# Patient Record
Sex: FEMALE | Race: WHITE | NOT HISPANIC OR LATINO | Employment: OTHER | URBAN - METROPOLITAN AREA
[De-identification: names, ages, dates, MRNs, and addresses within clinical notes are randomized per-mention and may not be internally consistent; named-entity substitution may affect disease eponyms.]

---

## 2017-01-08 DIAGNOSIS — G70.00 MYASTHENIA GRAVIS WITHOUT (ACUTE) EXACERBATION (HCC): ICD-10-CM

## 2017-01-27 ENCOUNTER — ALLSCRIPTS OFFICE VISIT (OUTPATIENT)
Dept: OTHER | Facility: OTHER | Age: 77
End: 2017-01-27

## 2017-02-08 DIAGNOSIS — G70.00 MYASTHENIA GRAVIS WITHOUT (ACUTE) EXACERBATION (HCC): ICD-10-CM

## 2017-02-08 DIAGNOSIS — G36.0 NEUROMYELITIS OPTICA (HCC): ICD-10-CM

## 2017-03-02 ENCOUNTER — LAB REQUISITION (OUTPATIENT)
Dept: LAB | Facility: HOSPITAL | Age: 77
End: 2017-03-02
Payer: MEDICARE

## 2017-03-02 DIAGNOSIS — G36.0 NEUROMYELITIS OPTICA (HCC): ICD-10-CM

## 2017-03-02 LAB
ALBUMIN SERPL BCP-MCNC: 2.8 G/DL (ref 3.5–5)
ALP SERPL-CCNC: 147 U/L (ref 46–116)
ALT SERPL W P-5'-P-CCNC: 15 U/L (ref 12–78)
ANION GAP SERPL CALCULATED.3IONS-SCNC: 7 MMOL/L (ref 4–13)
AST SERPL W P-5'-P-CCNC: 18 U/L (ref 5–45)
BASOPHILS # BLD AUTO: 0 THOUSANDS/ΜL (ref 0–0.1)
BASOPHILS NFR BLD AUTO: 1 % (ref 0–1)
BILIRUB SERPL-MCNC: 0.3 MG/DL (ref 0.2–1)
BUN SERPL-MCNC: 18 MG/DL (ref 5–25)
CALCIUM SERPL-MCNC: 8.5 MG/DL (ref 8.3–10.1)
CHLORIDE SERPL-SCNC: 100 MMOL/L (ref 100–108)
CO2 SERPL-SCNC: 32 MMOL/L (ref 21–32)
CREAT SERPL-MCNC: 0.83 MG/DL (ref 0.6–1.3)
EOSINOPHIL # BLD AUTO: 0.3 THOUSAND/ΜL (ref 0–0.61)
EOSINOPHIL NFR BLD AUTO: 5 % (ref 0–6)
ERYTHROCYTE [DISTWIDTH] IN BLOOD BY AUTOMATED COUNT: 14.5 % (ref 11.6–15.1)
GFR SERPL CREATININE-BSD FRML MDRD: >60 ML/MIN/1.73SQ M
GLUCOSE SERPL-MCNC: 94 MG/DL (ref 65–140)
HCT VFR BLD AUTO: 36.1 % (ref 37–47)
HGB BLD-MCNC: 11.6 G/DL (ref 12–16)
LYMPHOCYTES # BLD AUTO: 1.3 THOUSANDS/ΜL (ref 0.6–4.47)
LYMPHOCYTES NFR BLD AUTO: 21 % (ref 14–44)
MCH RBC QN AUTO: 31 PG (ref 27–31)
MCHC RBC AUTO-ENTMCNC: 32 G/DL (ref 31.4–37.4)
MCV RBC AUTO: 97 FL (ref 82–98)
MONOCYTES # BLD AUTO: 0.8 THOUSAND/ΜL (ref 0.17–1.22)
MONOCYTES NFR BLD AUTO: 14 % (ref 4–12)
NEUTROPHILS # BLD AUTO: 3.5 THOUSANDS/ΜL (ref 1.85–7.62)
NEUTS SEG NFR BLD AUTO: 59 % (ref 43–75)
PLATELET # BLD AUTO: 261 THOUSANDS/UL (ref 130–400)
PMV BLD AUTO: 8.7 FL (ref 8.9–12.7)
POTASSIUM SERPL-SCNC: 3.7 MMOL/L (ref 3.5–5.3)
PROT SERPL-MCNC: 6.9 G/DL (ref 6.4–8.2)
RBC # BLD AUTO: 3.73 MILLION/UL (ref 4.2–5.4)
SODIUM SERPL-SCNC: 139 MMOL/L (ref 136–145)
WBC # BLD AUTO: 5.9 THOUSAND/UL (ref 4.8–10.8)

## 2017-03-02 PROCEDURE — 85025 COMPLETE CBC W/AUTO DIFF WBC: CPT | Performed by: PHYSICIAN ASSISTANT

## 2017-03-02 PROCEDURE — 80053 COMPREHEN METABOLIC PANEL: CPT | Performed by: PHYSICIAN ASSISTANT

## 2017-03-24 RX ORDER — WARFARIN SODIUM 4 MG/1
4 TABLET ORAL EVERY EVENING
COMMUNITY

## 2017-03-24 RX ORDER — AMITRIPTYLINE HYDROCHLORIDE 75 MG/1
75 TABLET, FILM COATED ORAL
COMMUNITY
End: 2018-06-20 | Stop reason: DRUGHIGH

## 2017-03-24 RX ORDER — VENLAFAXINE 50 MG/1
50 TABLET ORAL EVERY MORNING
COMMUNITY
End: 2019-01-01 | Stop reason: DRUGHIGH

## 2017-03-27 ENCOUNTER — ANESTHESIA EVENT (OUTPATIENT)
Dept: PERIOP | Facility: AMBULARY SURGERY CENTER | Age: 77
End: 2017-03-27
Payer: MEDICARE

## 2017-03-27 PROBLEM — H26.9 CATARACT, RIGHT EYE: Status: ACTIVE | Noted: 2017-03-27

## 2017-03-28 ENCOUNTER — ANESTHESIA (OUTPATIENT)
Dept: PERIOP | Facility: AMBULARY SURGERY CENTER | Age: 77
End: 2017-03-28
Payer: MEDICARE

## 2017-03-28 ENCOUNTER — HOSPITAL ENCOUNTER (OUTPATIENT)
Facility: AMBULARY SURGERY CENTER | Age: 77
Setting detail: OUTPATIENT SURGERY
Discharge: HOME | End: 2017-03-28
Attending: OPHTHALMOLOGY | Admitting: OPHTHALMOLOGY
Payer: MEDICARE

## 2017-03-28 VITALS
TEMPERATURE: 97.4 F | OXYGEN SATURATION: 97 % | RESPIRATION RATE: 20 BRPM | SYSTOLIC BLOOD PRESSURE: 139 MMHG | WEIGHT: 260 LBS | BODY MASS INDEX: 39.4 KG/M2 | HEART RATE: 60 BPM | HEIGHT: 68 IN | DIASTOLIC BLOOD PRESSURE: 66 MMHG

## 2017-03-28 PROCEDURE — V2632 POST CHMBR INTRAOCULAR LENS: HCPCS | Performed by: OPHTHALMOLOGY

## 2017-03-28 DEVICE — IOL SN60WF 20.5: Type: IMPLANTABLE DEVICE | Site: EYE | Status: FUNCTIONAL

## 2017-03-28 RX ORDER — CYCLOPENTOLATE HYDROCHLORIDE 10 MG/ML
1 SOLUTION/ DROPS OPHTHALMIC
Status: COMPLETED | OUTPATIENT
Start: 2017-03-28 | End: 2017-03-28

## 2017-03-28 RX ORDER — LIDOCAINE HYDROCHLORIDE 10 MG/ML
INJECTION, SOLUTION INFILTRATION; PERINEURAL AS NEEDED
Status: DISCONTINUED | OUTPATIENT
Start: 2017-03-28 | End: 2017-03-28 | Stop reason: SURG

## 2017-03-28 RX ORDER — PROPOFOL 10 MG/ML
INJECTION, EMULSION INTRAVENOUS AS NEEDED
Status: DISCONTINUED | OUTPATIENT
Start: 2017-03-28 | End: 2017-03-28 | Stop reason: SURG

## 2017-03-28 RX ORDER — TOBRAMYCIN 3 MG/ML
SOLUTION/ DROPS OPHTHALMIC AS NEEDED
Status: DISCONTINUED | OUTPATIENT
Start: 2017-03-28 | End: 2017-03-28 | Stop reason: HOSPADM

## 2017-03-28 RX ORDER — TETRACAINE HYDROCHLORIDE 5 MG/ML
SOLUTION OPHTHALMIC AS NEEDED
Status: DISCONTINUED | OUTPATIENT
Start: 2017-03-28 | End: 2017-03-28 | Stop reason: HOSPADM

## 2017-03-28 RX ORDER — LIDOCAINE HYDROCHLORIDE 20 MG/ML
INJECTION, SOLUTION EPIDURAL; INFILTRATION; INTRACAUDAL; PERINEURAL AS NEEDED
Status: DISCONTINUED | OUTPATIENT
Start: 2017-03-28 | End: 2017-03-28 | Stop reason: HOSPADM

## 2017-03-28 RX ORDER — TROPICAMIDE 10 MG/ML
1 SOLUTION/ DROPS OPHTHALMIC
Status: COMPLETED | OUTPATIENT
Start: 2017-03-28 | End: 2017-03-28

## 2017-03-28 RX ORDER — SODIUM CHLORIDE 9 MG/ML
125 INJECTION, SOLUTION INTRAVENOUS CONTINUOUS
Status: DISCONTINUED | OUTPATIENT
Start: 2017-03-28 | End: 2017-03-28 | Stop reason: HOSPADM

## 2017-03-28 RX ORDER — ACETAMINOPHEN 325 MG/1
1000 TABLET ORAL EVERY 6 HOURS PRN
Status: DISCONTINUED | OUTPATIENT
Start: 2017-03-28 | End: 2017-03-28 | Stop reason: HOSPADM

## 2017-03-28 RX ORDER — OFLOXACIN 3 MG/ML
1 SOLUTION/ DROPS OPHTHALMIC
Status: COMPLETED | OUTPATIENT
Start: 2017-03-28 | End: 2017-03-28

## 2017-03-28 RX ADMIN — CYCLOPENTOLATE HYDROCHLORIDE 1 DROP: 10 SOLUTION/ DROPS OPHTHALMIC at 12:05

## 2017-03-28 RX ADMIN — CYCLOPENTOLATE HYDROCHLORIDE 1 DROP: 10 SOLUTION/ DROPS OPHTHALMIC at 11:45

## 2017-03-28 RX ADMIN — OFLOXACIN 1 DROP: 3 SOLUTION/ DROPS OPHTHALMIC at 11:54

## 2017-03-28 RX ADMIN — TROPICAMIDE 1 DROP: 10 SOLUTION/ DROPS OPHTHALMIC at 12:05

## 2017-03-28 RX ADMIN — CYCLOPENTOLATE HYDROCHLORIDE 1 DROP: 10 SOLUTION/ DROPS OPHTHALMIC at 11:54

## 2017-03-28 RX ADMIN — OFLOXACIN 1 DROP: 3 SOLUTION/ DROPS OPHTHALMIC at 12:05

## 2017-03-28 RX ADMIN — SODIUM CHLORIDE 125 ML/HR: 0.9 INJECTION, SOLUTION INTRAVENOUS at 11:53

## 2017-03-28 RX ADMIN — OFLOXACIN 1 DROP: 3 SOLUTION/ DROPS OPHTHALMIC at 11:45

## 2017-03-28 RX ADMIN — TROPICAMIDE 1 DROP: 10 SOLUTION/ DROPS OPHTHALMIC at 11:54

## 2017-03-28 RX ADMIN — LIDOCAINE HYDROCHLORIDE 20 MG: 10 INJECTION, SOLUTION INFILTRATION; PERINEURAL at 12:37

## 2017-03-28 RX ADMIN — PROPOFOL 75 MG: 10 INJECTION, EMULSION INTRAVENOUS at 12:37

## 2017-03-28 RX ADMIN — TROPICAMIDE 1 DROP: 10 SOLUTION/ DROPS OPHTHALMIC at 11:45

## 2017-04-18 ENCOUNTER — ALLSCRIPTS OFFICE VISIT (OUTPATIENT)
Dept: OTHER | Facility: OTHER | Age: 77
End: 2017-04-18

## 2017-06-19 ENCOUNTER — LAB REQUISITION (OUTPATIENT)
Dept: LAB | Facility: HOSPITAL | Age: 77
End: 2017-06-19
Payer: MEDICARE

## 2017-06-19 DIAGNOSIS — G70.00 MYASTHENIA GRAVIS WITHOUT (ACUTE) EXACERBATION (HCC): ICD-10-CM

## 2017-06-19 LAB
BASOPHILS # BLD AUTO: 0.03 THOUSANDS/ΜL (ref 0–0.1)
BASOPHILS NFR BLD AUTO: 1 % (ref 0–1)
EOSINOPHIL # BLD AUTO: 0.29 THOUSAND/ΜL (ref 0–0.61)
EOSINOPHIL NFR BLD AUTO: 5 % (ref 0–6)
ERYTHROCYTE [DISTWIDTH] IN BLOOD BY AUTOMATED COUNT: 16.2 % (ref 11.6–15.1)
HCT VFR BLD AUTO: 39.4 % (ref 34.8–46.1)
HGB BLD-MCNC: 12.3 G/DL (ref 11.5–15.4)
LYMPHOCYTES # BLD AUTO: 1.06 THOUSANDS/ΜL (ref 0.6–4.47)
LYMPHOCYTES NFR BLD AUTO: 19 % (ref 14–44)
MCH RBC QN AUTO: 31.7 PG (ref 26.8–34.3)
MCHC RBC AUTO-ENTMCNC: 31.2 G/DL (ref 31.4–37.4)
MCV RBC AUTO: 102 FL (ref 82–98)
MONOCYTES # BLD AUTO: 0.82 THOUSAND/ΜL (ref 0.17–1.22)
MONOCYTES NFR BLD AUTO: 15 % (ref 4–12)
NEUTROPHILS # BLD AUTO: 3.39 THOUSANDS/ΜL (ref 1.85–7.62)
NEUTS SEG NFR BLD AUTO: 60 % (ref 43–75)
NRBC BLD AUTO-RTO: 0 /100 WBCS
PLATELET # BLD AUTO: 381 THOUSANDS/UL (ref 149–390)
PMV BLD AUTO: 11.1 FL (ref 8.9–12.7)
RBC # BLD AUTO: 3.88 MILLION/UL (ref 3.81–5.12)
WBC # BLD AUTO: 5.6 THOUSAND/UL (ref 4.31–10.16)

## 2017-06-19 PROCEDURE — 85025 COMPLETE CBC W/AUTO DIFF WBC: CPT | Performed by: PSYCHIATRY & NEUROLOGY

## 2017-06-28 RX ORDER — GABAPENTIN 100 MG/1
100 CAPSULE ORAL 2 TIMES DAILY
COMMUNITY
End: 2019-01-01 | Stop reason: DRUGHIGH

## 2017-06-29 ENCOUNTER — ANESTHESIA EVENT (OUTPATIENT)
Dept: PERIOP | Facility: HOSPITAL | Age: 77
End: 2017-06-29
Payer: MEDICARE

## 2017-06-30 ENCOUNTER — HOSPITAL ENCOUNTER (OUTPATIENT)
Facility: HOSPITAL | Age: 77
Setting detail: OUTPATIENT SURGERY
Discharge: HOME HEALTH | End: 2017-06-30
Attending: UROLOGY | Admitting: UROLOGY
Payer: MEDICARE

## 2017-06-30 ENCOUNTER — ANESTHESIA (OUTPATIENT)
Dept: PERIOP | Facility: HOSPITAL | Age: 77
End: 2017-06-30
Payer: MEDICARE

## 2017-06-30 VITALS
BODY MASS INDEX: 37.89 KG/M2 | RESPIRATION RATE: 18 BRPM | HEIGHT: 68 IN | SYSTOLIC BLOOD PRESSURE: 119 MMHG | HEART RATE: 68 BPM | TEMPERATURE: 96.4 F | DIASTOLIC BLOOD PRESSURE: 57 MMHG | OXYGEN SATURATION: 96 % | WEIGHT: 250 LBS

## 2017-06-30 DIAGNOSIS — N21.0 CALCULUS IN BLADDER: ICD-10-CM

## 2017-06-30 PROBLEM — N31.9: Chronic | Status: ACTIVE | Noted: 2017-06-30

## 2017-06-30 LAB
BACTERIA UR QL AUTO: ABNORMAL /HPF
BILIRUB UR QL STRIP: NEGATIVE
CLARITY UR: ABNORMAL
COLOR UR: YELLOW
GLUCOSE UR STRIP-MCNC: NEGATIVE MG/DL
HGB UR QL STRIP.AUTO: ABNORMAL
KETONES UR STRIP-MCNC: NEGATIVE MG/DL
LEUKOCYTE ESTERASE UR QL STRIP: ABNORMAL
NITRITE UR QL STRIP: NEGATIVE
NON-SQ EPI CELLS URNS QL MICRO: ABNORMAL /HPF
PH UR STRIP.AUTO: 7 [PH] (ref 5–9)
PROT UR STRIP-MCNC: ABNORMAL MG/DL
RBC #/AREA URNS AUTO: ABNORMAL /HPF
SP GR UR STRIP.AUTO: 1.01 (ref 1–1.03)
UROBILINOGEN UR QL STRIP.AUTO: 0.2 E.U./DL
WBC #/AREA URNS AUTO: ABNORMAL /HPF

## 2017-06-30 PROCEDURE — 82360 CALCULUS ASSAY QUANT: CPT | Performed by: UROLOGY

## 2017-06-30 PROCEDURE — 88300 SURGICAL PATH GROSS: CPT | Performed by: UROLOGY

## 2017-06-30 PROCEDURE — 81001 URINALYSIS AUTO W/SCOPE: CPT | Performed by: UROLOGY

## 2017-06-30 RX ORDER — MAGNESIUM HYDROXIDE 1200 MG/15ML
LIQUID ORAL AS NEEDED
Status: DISCONTINUED | OUTPATIENT
Start: 2017-06-30 | End: 2017-06-30 | Stop reason: HOSPADM

## 2017-06-30 RX ORDER — PROPOFOL 10 MG/ML
INJECTION, EMULSION INTRAVENOUS AS NEEDED
Status: DISCONTINUED | OUTPATIENT
Start: 2017-06-30 | End: 2017-06-30 | Stop reason: SURG

## 2017-06-30 RX ORDER — OXYCODONE HYDROCHLORIDE AND ACETAMINOPHEN 5; 325 MG/1; MG/1
1 TABLET ORAL EVERY 4 HOURS PRN
Status: DISCONTINUED | OUTPATIENT
Start: 2017-06-30 | End: 2017-06-30 | Stop reason: HOSPADM

## 2017-06-30 RX ORDER — CIPROFLOXACIN 2 MG/ML
400 INJECTION, SOLUTION INTRAVENOUS ONCE
Status: COMPLETED | OUTPATIENT
Start: 2017-06-30 | End: 2017-06-30

## 2017-06-30 RX ORDER — FENTANYL CITRATE 50 UG/ML
INJECTION, SOLUTION INTRAMUSCULAR; INTRAVENOUS AS NEEDED
Status: DISCONTINUED | OUTPATIENT
Start: 2017-06-30 | End: 2017-06-30 | Stop reason: SURG

## 2017-06-30 RX ORDER — SODIUM CHLORIDE, SODIUM LACTATE, POTASSIUM CHLORIDE, CALCIUM CHLORIDE 600; 310; 30; 20 MG/100ML; MG/100ML; MG/100ML; MG/100ML
50 INJECTION, SOLUTION INTRAVENOUS CONTINUOUS
Status: DISCONTINUED | OUTPATIENT
Start: 2017-06-30 | End: 2017-06-30 | Stop reason: HOSPADM

## 2017-06-30 RX ADMIN — PROPOFOL 50 MG: 10 INJECTION, EMULSION INTRAVENOUS at 08:35

## 2017-06-30 RX ADMIN — PROPOFOL 50 MG: 10 INJECTION, EMULSION INTRAVENOUS at 08:42

## 2017-06-30 RX ADMIN — OXYCODONE HYDROCHLORIDE AND ACETAMINOPHEN 1 TABLET: 5; 325 TABLET ORAL at 09:30

## 2017-06-30 RX ADMIN — CIPROFLOXACIN 400 MG: 2 INJECTION, SOLUTION INTRAVENOUS at 07:55

## 2017-06-30 RX ADMIN — FENTANYL CITRATE 50 MCG: 50 INJECTION, SOLUTION INTRAMUSCULAR; INTRAVENOUS at 08:35

## 2017-06-30 RX ADMIN — SODIUM CHLORIDE, SODIUM LACTATE, POTASSIUM CHLORIDE, AND CALCIUM CHLORIDE 50 ML/HR: .6; .31; .03; .02 INJECTION, SOLUTION INTRAVENOUS at 07:42

## 2017-06-30 RX ADMIN — CIPROFLOXACIN 300 MG: 2 INJECTION INTRAVENOUS at 08:34

## 2017-06-30 RX ADMIN — FENTANYL CITRATE 50 MCG: 50 INJECTION, SOLUTION INTRAMUSCULAR; INTRAVENOUS at 08:50

## 2017-07-03 DIAGNOSIS — D72.821 MONOCYTOSIS (SYMPTOMATIC): ICD-10-CM

## 2017-07-03 DIAGNOSIS — C85.90 NON-HODGKIN LYMPHOMA (HCC): ICD-10-CM

## 2017-07-14 LAB
CA PHOS MFR STONE: 75 %
CALCIUM OXALATE DIHYDRATE MFR STONE IR: 10 %
COLOR STONE: NORMAL
COM MFR STONE: 15 %
COMMENT-STONE3: NORMAL
COMPOSITION: NORMAL
LABORATORY COMMENT REPORT: NORMAL
NIDUS STONE QL: NORMAL
PHOTO: NORMAL
SIZE STONE: NORMAL MM
STONE ANALYSIS-IMP: NORMAL
WT STONE: 13 MG

## 2017-07-17 ENCOUNTER — APPOINTMENT (OUTPATIENT)
Dept: LAB | Facility: HOSPITAL | Age: 77
End: 2017-07-17
Attending: INTERNAL MEDICINE
Payer: MEDICARE

## 2017-07-17 DIAGNOSIS — D72.821 MONOCYTOSIS (SYMPTOMATIC): ICD-10-CM

## 2017-07-17 LAB
ALBUMIN SERPL BCP-MCNC: 2.9 G/DL (ref 3.5–5)
ALP SERPL-CCNC: 132 U/L (ref 46–116)
ALT SERPL W P-5'-P-CCNC: 10 U/L (ref 12–78)
ANION GAP SERPL CALCULATED.3IONS-SCNC: 8 MMOL/L (ref 4–13)
AST SERPL W P-5'-P-CCNC: 17 U/L (ref 5–45)
BASOPHILS # BLD AUTO: 0.03 THOUSANDS/ΜL (ref 0–0.1)
BASOPHILS NFR BLD AUTO: 1 % (ref 0–1)
BILIRUB SERPL-MCNC: 0.28 MG/DL (ref 0.2–1)
BUN SERPL-MCNC: 11 MG/DL (ref 5–25)
CALCIUM SERPL-MCNC: 8.8 MG/DL (ref 8.3–10.1)
CHLORIDE SERPL-SCNC: 99 MMOL/L (ref 100–108)
CO2 SERPL-SCNC: 29 MMOL/L (ref 21–32)
CREAT SERPL-MCNC: 0.73 MG/DL (ref 0.6–1.3)
EOSINOPHIL # BLD AUTO: 0.24 THOUSAND/ΜL (ref 0–0.61)
EOSINOPHIL NFR BLD AUTO: 4 % (ref 0–6)
ERYTHROCYTE [DISTWIDTH] IN BLOOD BY AUTOMATED COUNT: 15.3 % (ref 11.6–15.1)
GFR SERPL CREATININE-BSD FRML MDRD: >60 ML/MIN/1.73SQ M
GLUCOSE P FAST SERPL-MCNC: 72 MG/DL (ref 65–99)
HCT VFR BLD AUTO: 37.4 % (ref 34.8–46.1)
HGB BLD-MCNC: 11.9 G/DL (ref 11.5–15.4)
LYMPHOCYTES # BLD AUTO: 1 THOUSANDS/ΜL (ref 0.6–4.47)
LYMPHOCYTES NFR BLD AUTO: 18 % (ref 14–44)
MCH RBC QN AUTO: 32.7 PG (ref 26.8–34.3)
MCHC RBC AUTO-ENTMCNC: 31.8 G/DL (ref 31.4–37.4)
MCV RBC AUTO: 103 FL (ref 82–98)
MONOCYTES # BLD AUTO: 0.89 THOUSAND/ΜL (ref 0.17–1.22)
MONOCYTES NFR BLD AUTO: 16 % (ref 4–12)
NEUTROPHILS # BLD AUTO: 3.41 THOUSANDS/ΜL (ref 1.85–7.62)
NEUTS SEG NFR BLD AUTO: 61 % (ref 43–75)
NRBC BLD AUTO-RTO: 0 /100 WBCS
PLATELET # BLD AUTO: 276 THOUSANDS/UL (ref 149–390)
PMV BLD AUTO: 10.9 FL (ref 8.9–12.7)
POTASSIUM SERPL-SCNC: 3.4 MMOL/L (ref 3.5–5.3)
PROT SERPL-MCNC: 6.7 G/DL (ref 6.4–8.2)
RBC # BLD AUTO: 3.64 MILLION/UL (ref 3.81–5.12)
SODIUM SERPL-SCNC: 136 MMOL/L (ref 136–145)
WBC # BLD AUTO: 5.58 THOUSAND/UL (ref 4.31–10.16)

## 2017-07-17 PROCEDURE — 36415 COLL VENOUS BLD VENIPUNCTURE: CPT

## 2017-07-17 PROCEDURE — 80053 COMPREHEN METABOLIC PANEL: CPT

## 2017-07-17 PROCEDURE — 85025 COMPLETE CBC W/AUTO DIFF WBC: CPT

## 2017-07-18 ENCOUNTER — ALLSCRIPTS OFFICE VISIT (OUTPATIENT)
Dept: OTHER | Facility: OTHER | Age: 77
End: 2017-07-18

## 2017-07-18 ENCOUNTER — HOSPITAL ENCOUNTER (OUTPATIENT)
Dept: INFUSION CENTER | Facility: HOSPITAL | Age: 77
Discharge: HOME | End: 2017-07-18
Payer: MEDICARE

## 2017-07-18 PROCEDURE — 96523 IRRIG DRUG DELIVERY DEVICE: CPT

## 2017-07-18 RX ADMIN — Medication 300 UNITS: at 14:36

## 2017-07-18 NOTE — PLAN OF CARE
Problem: Potential for Falls  Goal: Patient will remain free of falls  INTERVENTIONS:  - Assess patient frequently for physical needs  -  Identify cognitive and physical deficits and behaviors that affect risk of falls    -  [place] fall precautions as indicated by assessment   - Educate patient/family on patient safety including physical limitations  - Instruct patient to call for assistance with activity based on assessment  - Modify environment to reduce risk of injury  - Consider OT/PT consult to assist with strengthening/mobility   Outcome: Progressing

## 2017-08-10 ENCOUNTER — ALLSCRIPTS OFFICE VISIT (OUTPATIENT)
Dept: OTHER | Facility: OTHER | Age: 77
End: 2017-08-10

## 2017-08-10 ENCOUNTER — GENERIC CONVERSION - ENCOUNTER (OUTPATIENT)
Dept: OTHER | Facility: OTHER | Age: 77
End: 2017-08-10

## 2017-08-10 DIAGNOSIS — R53.2 FUNCTIONAL QUADRIPLEGIA (HCC): ICD-10-CM

## 2017-08-10 DIAGNOSIS — C85.90 NON-HODGKIN LYMPHOMA (HCC): ICD-10-CM

## 2017-08-10 DIAGNOSIS — M62.81 MUSCLE WEAKNESS (GENERALIZED): ICD-10-CM

## 2017-08-16 ENCOUNTER — HOSPITAL ENCOUNTER (OUTPATIENT)
Dept: RADIOLOGY | Facility: HOSPITAL | Age: 77
Discharge: HOME | End: 2017-08-16
Payer: MEDICARE

## 2017-08-16 ENCOUNTER — TRANSCRIBE ORDERS (OUTPATIENT)
Dept: ADMINISTRATIVE | Facility: HOSPITAL | Age: 77
End: 2017-08-16

## 2017-08-16 ENCOUNTER — HOSPITAL ENCOUNTER (OUTPATIENT)
Dept: RADIOLOGY | Facility: HOSPITAL | Age: 77
Discharge: HOME | End: 2017-08-16
Attending: INTERNAL MEDICINE
Payer: MEDICARE

## 2017-08-16 DIAGNOSIS — M62.81 MUSCLE WEAKNESS (GENERALIZED): Primary | ICD-10-CM

## 2017-08-16 DIAGNOSIS — M62.81 MUSCLE WEAKNESS (GENERALIZED): ICD-10-CM

## 2017-08-16 DIAGNOSIS — C85.90 NON-HODGKIN LYMPHOMA (HCC): ICD-10-CM

## 2017-08-16 PROCEDURE — 73560 X-RAY EXAM OF KNEE 1 OR 2: CPT

## 2017-08-16 PROCEDURE — 71260 CT THORAX DX C+: CPT

## 2017-08-16 RX ADMIN — IOHEXOL 85 ML: 350 INJECTION, SOLUTION INTRAVENOUS at 08:46

## 2017-08-17 ENCOUNTER — GENERIC CONVERSION - ENCOUNTER (OUTPATIENT)
Dept: OTHER | Facility: OTHER | Age: 77
End: 2017-08-17

## 2017-08-21 ENCOUNTER — GENERIC CONVERSION - ENCOUNTER (OUTPATIENT)
Dept: OTHER | Facility: OTHER | Age: 77
End: 2017-08-21

## 2017-09-08 ENCOUNTER — HOSPITAL ENCOUNTER (OUTPATIENT)
Dept: RADIOLOGY | Facility: HOSPITAL | Age: 77
Discharge: HOME | End: 2017-09-08
Payer: MEDICARE

## 2017-09-08 DIAGNOSIS — M62.81 MUSCLE WEAKNESS (GENERALIZED): ICD-10-CM

## 2017-09-08 DIAGNOSIS — R53.2 FUNCTIONAL QUADRIPLEGIA (HCC): ICD-10-CM

## 2017-09-08 PROCEDURE — 77080 DXA BONE DENSITY AXIAL: CPT

## 2017-09-11 ENCOUNTER — GENERIC CONVERSION - ENCOUNTER (OUTPATIENT)
Dept: OTHER | Facility: OTHER | Age: 77
End: 2017-09-11

## 2017-11-01 ENCOUNTER — LAB REQUISITION (OUTPATIENT)
Dept: LAB | Facility: HOSPITAL | Age: 77
End: 2017-11-01
Payer: MEDICARE

## 2017-11-01 DIAGNOSIS — G70.00 MYASTHENIA GRAVIS WITHOUT (ACUTE) EXACERBATION (HCC): ICD-10-CM

## 2017-11-01 LAB
BASOPHILS # BLD AUTO: 0.02 THOUSANDS/ΜL (ref 0–0.1)
BASOPHILS NFR BLD AUTO: 0 % (ref 0–1)
EOSINOPHIL # BLD AUTO: 0.32 THOUSAND/ΜL (ref 0–0.61)
EOSINOPHIL NFR BLD AUTO: 5 % (ref 0–6)
ERYTHROCYTE [DISTWIDTH] IN BLOOD BY AUTOMATED COUNT: 14.2 % (ref 11.6–15.1)
HCT VFR BLD AUTO: 38.2 % (ref 34.8–46.1)
HGB BLD-MCNC: 12.2 G/DL (ref 11.5–15.4)
LYMPHOCYTES # BLD AUTO: 1.24 THOUSANDS/ΜL (ref 0.6–4.47)
LYMPHOCYTES NFR BLD AUTO: 18 % (ref 14–44)
MCH RBC QN AUTO: 33.1 PG (ref 26.8–34.3)
MCHC RBC AUTO-ENTMCNC: 31.9 G/DL (ref 31.4–37.4)
MCV RBC AUTO: 104 FL (ref 82–98)
MONOCYTES # BLD AUTO: 0.8 THOUSAND/ΜL (ref 0.17–1.22)
MONOCYTES NFR BLD AUTO: 11 % (ref 4–12)
NEUTROPHILS # BLD AUTO: 4.6 THOUSANDS/ΜL (ref 1.85–7.62)
NEUTS SEG NFR BLD AUTO: 66 % (ref 43–75)
NRBC BLD AUTO-RTO: 0 /100 WBCS
PLATELET # BLD AUTO: 456 THOUSANDS/UL (ref 149–390)
PMV BLD AUTO: 10.7 FL (ref 8.9–12.7)
RBC # BLD AUTO: 3.69 MILLION/UL (ref 3.81–5.12)
WBC # BLD AUTO: 7 THOUSAND/UL (ref 4.31–10.16)

## 2017-11-01 PROCEDURE — 85025 COMPLETE CBC W/AUTO DIFF WBC: CPT | Performed by: PSYCHIATRY & NEUROLOGY

## 2017-11-13 DIAGNOSIS — G36.0 NEUROMYELITIS OPTICA (HCC): ICD-10-CM

## 2017-12-18 ENCOUNTER — APPOINTMENT (OUTPATIENT)
Dept: LAB | Facility: HOSPITAL | Age: 77
End: 2017-12-18
Payer: MEDICARE

## 2017-12-18 DIAGNOSIS — G36.0 NEUROMYELITIS OPTICA (HCC): ICD-10-CM

## 2017-12-18 LAB
ALBUMIN SERPL BCP-MCNC: 2.5 G/DL (ref 3.5–5)
ALP SERPL-CCNC: 106 U/L (ref 46–116)
ALT SERPL W P-5'-P-CCNC: 12 U/L (ref 12–78)
ANION GAP SERPL CALCULATED.3IONS-SCNC: 4 MMOL/L (ref 4–13)
AST SERPL W P-5'-P-CCNC: 16 U/L (ref 5–45)
BILIRUB SERPL-MCNC: 0.29 MG/DL (ref 0.2–1)
BUN SERPL-MCNC: 19 MG/DL (ref 5–25)
CALCIUM SERPL-MCNC: 9.1 MG/DL (ref 8.3–10.1)
CHLORIDE SERPL-SCNC: 102 MMOL/L (ref 100–108)
CO2 SERPL-SCNC: 29 MMOL/L (ref 21–32)
CREAT SERPL-MCNC: 0.92 MG/DL (ref 0.6–1.3)
GFR SERPL CREATININE-BSD FRML MDRD: 60 ML/MIN/1.73SQ M
GLUCOSE SERPL-MCNC: 86 MG/DL (ref 65–140)
POTASSIUM SERPL-SCNC: 4.2 MMOL/L (ref 3.5–5.3)
PROT SERPL-MCNC: 7.1 G/DL (ref 6.4–8.2)
SODIUM SERPL-SCNC: 135 MMOL/L (ref 136–145)

## 2017-12-18 PROCEDURE — 80053 COMPREHEN METABOLIC PANEL: CPT

## 2017-12-18 PROCEDURE — 36415 COLL VENOUS BLD VENIPUNCTURE: CPT

## 2017-12-19 ENCOUNTER — ALLSCRIPTS OFFICE VISIT (OUTPATIENT)
Dept: OTHER | Facility: OTHER | Age: 77
End: 2017-12-19

## 2017-12-20 DIAGNOSIS — G36.0 NEUROMYELITIS OPTICA (HCC): ICD-10-CM

## 2017-12-21 NOTE — PROGRESS NOTES
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)   2  Vasovagal syncope (780 2) (R55)    Plan   Neuromyelitis optica    · Mycophenolate Mofetil 500 MG Oral Tablet; TAKE 1 TABLET BY MOUTH ONCE A    DAY   Rx By: Nabeel Li; Dispense: 30 Days ; #:30 Tablet; Refill: 1;For: Neuromyelitis optica; HUDSON = N; Verified Transmission to [facility] #033; Last Updated By: System, SureScripts; 12/19/2017 2:32:50 PM   · Vitamin D (Ergocalciferol) 47034 UNIT Oral Capsule; TAKE ONE CAPSULE BY    MOUTH WEEKLY   Rx By: Nabeel Li; Dispense: 57 Days ; #:8 Capsule; Refill: 0;For: Neuromyelitis optica; HUDSON = N; Sent To: [facility]   · (1) CBC/PLT/DIFF; Status:Active; Requested for:[id number];    Perform:[facility]; Due:89Xnm4180; Ordered;For:Neuromyelitis optica; Ordered By:Dahiana Zaavla;  Other depressive disorder    · Venlafaxine HCl  MG Oral Capsule Extended Release 24 Hour; take 1    capsule daily   Rx By: Nabeel Li; Dispense: 30 Days ; #:30 Capsule Extended Release 24 Hour; Refill: 5;For: Other depressive disorder; HUDSON = N; Verified Transmission to [facility] #[facility]; Last Updated By: System, SureScripts; 12/19/2017 2:32:50 PM    Discussion/Summary   Discussion Summary:    Patient is here for follow up remains on Cellcept 500mg daily had labs drawn yesterday and I only have the CMP result  Does not appear CBC was drawn  The visiting nurse draws her labs  Last abs lymphs were done in Nov and were 1 24 [facility] to draw a CBC with diff this month recently had an increase in neuropathic pain and spasms in the legs and left arm  She is currently taking gabapentin 300mg TID  Rx is given by Dr Katlin Mejias  She has 600mg caps  Suggested she take 300mg BID and 600mg at bedtime for now did not see cardiology regarding her syncope, which was felt to be vasovagal  She reports she had an episode of syncope the other night   She was laying back in her chair against the sink and then she sat up very quickly and passed out  She has an established cardiologist Dr Holly Strickland, closer to her home, and will make an appt is stable today up in 4 months for any new symptoms  Counseling Documentation With Imm: The patient, patient's family was counseled regarding diagnostic results,-- instructions for management,-- risk factor reductions,-- prognosis,-- patient and family education,-- impressions,-- risks and benefits of treatment options,-- importance of compliance with treatment  total time of encounter was 30 minutes-- and-- >50% minutes was spent counseling  Chief Complaint   Chief Complaint Free Text Note Form: Patient presents for f/u for NMO      History of Present Illness   HPI: 69 y/o female presents for NMO follow up, last seen in August 2017  Review of past years includes admission in June 2014 for a syncopal episode  Pt was taken to [address] [place] [address] for eval  In the ED she was found to have BP of 105/69, sodium 131 and positive urine studies for UTI  She was found to be dehydrated and hyponatremic  She was admitted from 6/21/14 to 6/28/14  During her hospitalization, Dr Galicia of nephrology was following her case  He believed her sodium was low due to her Trileptal which was dosed at 600mg BID  She has since been titrated off Trileptal  Pt was also admitted from 8/7/14-8/13/14 for acute Devics symptoms  She woke up the day prior to her admission with the inability to move her legs as well as a burning sensation in legs, bowel and bladder incontinence    During her hospital stay she received MRI L-Spine & c-spine; which showed new T-spine lesion however no MRI t-spine or brain was ordered  She was treated with 5 days of IV steroids which has regained some movement in her legs  Also she was treated for left foot ulcer with concern for osteomyelitis   Pt has been hospitalized 2 additional times from 10/14/14-10/24/14 for left upper extremity and bilateral lower extremity weakness  She had MRI c-spine which reveals new demyelinating lesion as well as disease progression on MRI t-spine  She received 5 days of IV steroids followed up by 5 days of IVIG  She was then admitted for a second time from 11/26/14-11/28/14 for increased weakness in right leg and left upper extremity  She did not received any updated imaging and only received 1 dose of IV steroids in the ER  She was discharged to [facility] for continued rehab  From 1/2014 to 1/2015 pt has had 5 inpatient admissions for NMO flares: April 2014, June 2014, August 2014, October 2014, November 2014 and Jan 2015  Pt was hospitalized at [place] [address]  from 1/14/15-1/21/15 with SOB, Left LE weakness, wt gain  She was dx with NMO exacerbation, URI, Escherichia coli UTI, and SIADH  MRI of the thoracic spine revealed significant progression of disease at the upper thoracic cord, T1-T4, where diffuse cord enhancement was noted, new from prior study on 12/2014  MRI c-spine & l-spine were stable  She was given 5 doses of IVIG as well as 5 days of intravenous steroids  She was discharged back to [facility]  There have been discussions with Dr Hernandez at [facility] about possible Eculizumab trial, however she is not a candidate due to the fact that she is nonambulatory  She was on Cellcept 1,000mg BID, as well as Prednisone 20mg daily  Pt is getting frequent CBC w/diff as well as CMP  Gina Cunning At that time, starting WBC 5 36 & abs lymphs 0 8  She currently has suprapubic catheter, no urinary changes  April 2015, she described difficulty with increased muscle spasms from bilateral hips to her feet, as well as left hand numbness and swelling  Her diazepam was increased  Her prednisone was stopped  Unfortunately in August 2015, she developed further increased spasms in her lower extremity as well as left arm up  She noted increased weakness   She presented to the hospital  She had MRI Cervical Sequela of demyelinating disease at C5-6, C7, and upper thoracic spinal cord are less well-seen on today's study particularly at C7 and upper thoracic spinal cord  The C5-6 lesion has a grossly stable appearance from the prior study  Stable mild to moderate degenerative changes  MRI Brain 8/26/15: There are foci of T2 and FLAIR signal abnormality in the periventricular and subcortical white matter which are typical for microvascular disease in patients of this age group  Brainstem and cerebellum demonstrate normal signal  no abnormal enhancement  MRI Thoracic noted multifocal disease within the cord is stable save for the T10 level where a broad-based dorsal focus of signal abnormality has become more confluent  There is no pathologic enhancement to suggest acute inflammation  Minor degenerative changes without compressive cord or root disease  She was found to have a UTI  At that time, it was thought she was experiencing a Devicâs exacerbation and underwent 3 treatments of IVIG  She was transferred to [facility]  During her time at [facility], she was treated for lower extremity swelling, she had Doppler's done which were negative, her pain medication was also changed from gabapentin to Lyrica  From there, she was transferred to [facility]  Of note, in October, her absolute lymphocytes were noted to be decreased at 0 4  Dr Aziza Leija was contacted, he was made aware, her CellCept was subsequently decreased to 500 mg twice a day  She has been home around November 2015  She missed a several follow-up office visits in the interim  Patient in hospital in march 2016 due to breakdown of skin in both legs  pt was admitted and needed wound vac  pt was told the decbutius was down to bone but no infection in the bone  pt notes significant healing since finally getting to wound team     patient reports she is overall doing well  She remains on CellCept 500 mg daily   Patient reports she is in contact with Dr Robin Nixon as well  She reports a few months ago he increased her gabapentin to 300 mg t i d  He is the one who prescribed this for her  Patient reports she recently had an increase in neuropathic pain and spasms in her legs and the left arm  She continues to follow with Urology for her suprapubic catheter reports no issues with that  She denies any recent hospitalizations, illness, infection  At last visit, there was some discussion about syncope when straining to L movement  Apparently this has been resolved due to she has been having bowel movements in her bed into a bedpan instead of sitting on a toilet  She does report that last week she was lying back in a chair to wash her hair and set up rather quickly and had an episode of syncope  She did not schedule with cardiologist as advised last visit  She reports she has a longstanding cardiologist in [place] who she follows with and will make an appointment  She had labs drawn yesterday but only a CMP was drawn, not a CBC  Last CBC was done in November with absolute lymphs 1 24  She denies any vision changes she denies any trouble with speech or swallowing  Denies any major bowel or bladder changes  Review of Systems   Neurological ROS:      Constitutional: chills,-- recent weight gain-- and-- fatigue  HEENT: dryness of the eyes-- and-- tinnitus  Cardiovascular:  no chest pain or pressure, no palpitations present, the heart rate was not rapid or irregular, no swelling in the arms or legs, no poor circulation  Respiratory:  no unusual or persistant cough, no shortness of breath with or without exertion  Gastrointestinal:  no nausea, no vomiting, no diarrhea, no abdominal pain, no changes in bowel habits, no melena, no loss of bowel control  Genitourinary: incontinence  Musculoskeletal: arthralgias,-- immobility or loss of function-- and-- head/neck/back pain  Integumentary   no masses, no rash, no skin lesions, no livedo reticularis  Psychiatric: depression  Endocrine  no unusual weight loss or gain, no excessive urination, no excessive thirst, no hair loss or gain, no hot or cold intolerance, no menstrual period change or irregularity, no loss of sexual ability or drive, no erection difficulty, no nipple discharge  Hematologic/Lymphatic:  no unusual bleeding, no tendency for easy bruising, no clotting skin or lumps  Neurological General: headache,-- lightheadedness-- and-- blackouts  Neurological Mental Status: alteration or loss of consciousness  Neurological Cranial Nerves:  no blurry or double vision, no loss of vision, no face drooping, no facial numbness or weakness, no taste or smell loss/changes, no hearing loss or ringing, no vertigo or dizziness, no dysphagia, no slurred speech  Neurological Motor findings include:  no tremor, no twitching, no cramping(pre/post exercise), no atrophy  Neurological Coordination:  no unsteadiness, no vertigo or dizziness, no clumsiness, no problems reaching for objects  Neurological Sensory: numbness-- and-- tingling  Neurological Gait:  no difficulty walking, not falling to one side, no sensation of being pushed, has not had falls  ROS Reviewed:    ROS reviewed  Active Problems   1  Acute deep vein thrombosis of lower extremity (453 40) (I82 409)   2  Acute pain of right knee (719 46) (M25 561)   3  Chronic intermittent steroid use (V58 65)   4  Chronic venous embolism and thrombosis of deep vessels of lower extremity (453 50)     (I82 509)   5  Decubitus ulcer, unspecified pressure ulcer stage   6  Foot-drop (736 79) (M21 379)   7  Functional quadriplegia (780 72) (R53 2)   8  Hyponatremia (276 1) (E87 1)   9  Hypotension (458 9) (I95 9)   10  Hypothyroidism (244 9) (E03 9)   11  Ingrowing nail (703 0) (L60 0)   12  Leg weakness (729 89) (M62 81)   13  Leukopenia (288 50) (D72 819)   14   Macular degeneration (362 50) (H35 30)   15  Monocytosis (288 63) (D72 821)   16  Muscle weakness (728 87) (M62 81)   17  Myasthenia gravis (358 00) (G70 00)   18  Neurogenic bladder (596 54) (N31 9)   19  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)   20  Non Hodgkin's lymphoma (202 80) (C85 90)   21  Orthostatic hypotension (458 0) (I95 1)   22  Other depressive disorder (311) (F32 9)   23  Paronychia of toe (681 11) (L03 039)   24  Port-a-cath in place (V45 89) (D01 392)   25  Raynaud's syndrome without gangrene (443 0) (I73 00)   26  Right knee pain (719 46) (M25 561)   27  SIADH (syndrome of inappropriate ADH production) (253 6) (E22 2)   28  Swelling of extremity, left (729 81) (M79 89)   29  Urinary tract infection (599 0) (N39 0)   30  Vasovagal syncope (780 2) (R55)   31  Venous thrombosis (453 9) (I82 90)    Past Medical History   1  History of Closed Fracture Of Multiple Ribs (807 09)   2  History of Closed Pelvic Fracture (808 8)   3  History of Fibrocystic breast disease, unspecified laterality (610 1) (N60 19)   4  History of Fracture Of The Ankle (824 8)   5  History of Fracture Of The Foot (827 0)   6  History of Fracture Of The Vertebral Column (805 8)   7  History of anemia (V12 3) (Z86 2)   8  History of Left Shoulder Fracture (812 00)   9  History of Septicemia (038 9) (A41 9)  Active Problems And Past Medical History Reviewed: The active problems and past medical history were reviewed and updated today  Surgical History   1  History of Appendectomy   2  History of Cardiac Catheter His Ablation   3  History of Cataract Surgery   4  History of Cholecystectomy   5  History of Hysterectomy   6  History of Incisional Breast Biopsy   7  History of Knee Arthroscopy (Therapeutic)   8  History of Neuroplasty Decompression Median Nerve At Carpal Tunnel   9  History of Rotator Cuff Repair   10  History of Splenectomy  Surgical History Reviewed: The surgical history was reviewed and updated today  Family History   Mother    1  Family history of Chronic Obstructive Pulmonary Disease   2  Family history of Emphysema  Father    3  Family history of Chronic Obstructive Pulmonary Disease   4  Family history of Emphysema  Daughter    5  Family history of Bipolar Disorder NOS   6  Family history of Ovarian Cancer (V16 41)   7  Family history of Suicide Completion  Brother    8  Family history of Chronic Obstructive Pulmonary Disease   9  Family history of Malignant Pancreatic Neoplasm  Maternal Grandmother    10  Family history of Basal Cell Carcinoma Of The Skin  Family History Reviewed: The family history was reviewed and updated today  Social History    · Denied: History of Alcohol Use (History)   · Daily Coffee Consumption (1  Cups/Day)   · Drinks coffee   · Marital History -  (V61 03)   · Never A Smoker   · Never Used Drugs   · Occupation: Retired   · Tea  Social History Reviewed: The social history was reviewed and updated today  Current Meds    1  Brimonidine Tartrate 0 15 % Ophthalmic Solution; Therapy: (Recorded:22Igr6651) to Recorded   2  Coumadin 5 MG Oral Tablet; Therapy: (Recorded:67Fxr3093) to Recorded   3  Dulcolax 10 MG Rectal Suppository; Therapy: (Recorded:94Cnv9549) to Recorded   4  Fleet Enema ENEM; Therapy: (Recorded:02Obk5244) to Recorded   5  Furosemide 40 MG Oral Tablet; TAKE 1 TABLET DAILY; Therapy: (Heath Dickerson) to Recorded   6  Gabapentin 100 MG Oral Capsule; TAKE 1 CAPSULE ONCE DAILY; Therapy: 39Wyu9425 to (Last Rx:18Apr2017)  Requested for: 10Aug2017 Ordered   7  MiraLax Oral Powder; MIX 17 GM IN 8 OUNCES OF LIQUID AND DRINK 1 TO 2 TIMES     DAILY FOR CONSTIPATION; Therapy: (Recorded:23Imv9998) to Recorded   8  Mycophenolate Mofetil 500 MG Oral Tablet; TAKE 1 TABLET BY MOUTH ONCE A DAY; Therapy: 47Chy3007 to (Evaluate:12Jan2018)  Requested for: 21KBX1281; Last     Rx:13Nov2017 Ordered   9  Potassium TABS; Therapy: (Heath Dickerson) to Recorded   10  Venlafaxine HCl ER 75 MG Oral Capsule Extended Release 24 Hour; TAKE 1 CAPSULE      BY MOUTH ONCE A DAY; Therapy: 21LCB9177 to [phone]  Requested for: 78HRY8910; Last      Rx:11Oct2017 Ordered   11  Vitamin D (Ergocalciferol) 66124 UNIT Oral Capsule; TAKE ONE CAPSULE BY MOUTH      WEEKLY; Therapy: 70ZON9548 to (Evaluate:99Mdh7672)  Requested for: 71OFR3789; Last      Rx:10Oct2017; Status: ACTIVE - Transmit to [place] Verification Ordered   12  Vitamin D3 77553 UNIT Oral Capsule; Therapy: (Recorded:28Jan2016) to Recorded  Medication List Reviewed: The medication list was reviewed and updated today  Allergies   1  Rituxan CONC   2  Baclofen TABS   3  Cipro SOLN   4  Dilaudid TABS   5  Penicillins   6  Talwin SOLN   7  Ultram TABS    Vitals   Signs   Recorded: 06EPC0328 02:13PM   Heart Rate: 84  Systolic: 365, RUE, Sitting  Diastolic: 60, RUE, Sitting  Height: 5 ft 7 in  Weight Unobtainable: Yes    Physical Exam        Constitutional      General appearance: No acute distress, well appearing and well nourished  Musculoskeletal      Gait and station: Abnormal  -- pt non-ambulatory, presented in motorized wheelchair  Muscle strength: Abnormal        Motor Strength:  the patient is right hand dominant  Strength examination:-- 4-/5 on the right upper, 3+/5 left upper- left hand  weaker than right, right lower 3+/5, left lower 0/5  Muscle tone: Abnormal  -- increased throughout, lowers greater than uppers  Involuntary movements: None observed  Neurologic      Orientation to person, place, and time: Normal        Recent and remote memory: Demonstrates normal memory  Attention span and concentration: Normal thought process and attention span  Language: Names objects, able to repeat phrases and speaks spontaneously  Fund of knowledge: Normal vocabulary with appropriate knowledge of current events and past history         2nd cranial nerve: Normal        3rd, 4th, and 6th cranial nerves: Normal   extraocular movements intact      5th cranial nerve: Normal        7th cranial nerve: Normal        8th cranial nerve: Normal        9th cranial nerve: Normal        11th cranial nerve: Normal        12th cranial nerve: Normal        Sensation: Abnormal  -- decreased vibratory sensation peng LEs; pt does not feel pin pricks until level of abd  Reflexes: Abnormal  -- decreased AJS  Coordination: Abnormal  -- past pointing with left upper; slight fixation on left upper; unable to perform EMANUEL in lower extremities  Mood and affect: Normal   Mood and Affect: appropriate mood-- and-- appropriate affect  Results/Data   Diagnostic Studies Reviewed: I personally reviewed the films/images/results in the office today  My interpretation follows  Attending Note   Collaborating Physician Note: Collaborating Note: I agree with the Advanced Practitioner note  Future Appointments      Date/Time Provider Specialty Site   07/16/2018 01:00 PM LAMINE Jiménez   Hematology Oncology [facility]     Signatures    Electronically signed by : Yasmin Castro, [facility]; Dec 20 2017  9:17AM EST                       (Author)     Electronically signed by : LAMINE Pang ; Dec 20 2017  3:15PM EST                       (Co-author)

## 2017-12-22 ENCOUNTER — ALLSCRIPTS OFFICE VISIT (OUTPATIENT)
Dept: OTHER | Facility: OTHER | Age: 77
End: 2017-12-22

## 2017-12-23 NOTE — PROGRESS NOTES
Assessment   1  Ingrown right big toenail (703 0) (L60 0)   2  Contusion of left great toe with damage to nail, initial encounter (924 3) (K55 898O)   3  Cellulitis of right foot (682 7) (L03 115)    Plan    · Mupirocin 2 % External Ointment; APPLY 1 INCH Daily   · Follow-up visit in 1 week Evaluation and Treatment  Follow-up  Status: Hold For -    Scheduling  Requested for: 56Sxt9749   · It is important to take good care of your feet ; Status:Complete;   Done: 72NWW4803    02:41PM    Discussion/Summary      Clindamycin daily  Bactroban and dry sterile dressing daily  Patient to call if any fever chills or signs of infection  Follow up 1 week  The patient, patient's family was counseled regarding diagnostic results,-- instructions for management,-- risk factor reductions,-- prognosis,-- patient and family education,-- impressions,-- risks and benefits of treatment options,-- importance of compliance with treatment,-- Use of night splint in order to maintain dorsiflexion ability of ankle bilateral     The treatment plan was reviewed with the patient/guardian  The patient/guardian understands and agrees with the treatment plan      Chief Complaint   Patient is wheelchair bound  Patient has chronic swelling legs  Patient is brought in on emergent visit 3 days ago she was going to the doctor and she bumped her right foot  Has a loose an infected right hallux nail  There is also loss of the 2nd digit nail  Nail bed has a superficial granular wound  Patient has localized redness  Patient just started clindamycin today for dental problems  History of Present Illness   HPI: Patient presents in a scooter  Her concerns are dropfoot  In addition ingrown toenails  Family members to cut her nails but she does have concern about pus that came out the right big toe  Patient has hypoesthesia  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      Review of Systems        Constitutional: chills        HEENT: sinus problems,-- dryness of the eyes,-- tinnitus-- and-- hoarseness  Cardiovascular:  no chest pain or pressure, no palpitations present, the heart rate was not rapid or irregular, no swelling in the arms or legs, no poor circulation  Respiratory:  no unusual or persistant cough, no shortness of breath with or without exertion  Gastrointestinal: loss of bowel control  Genitourinary:  no incontinence, no feelings of urinary urgency, no increase in frequency, no urinary hesitancy, no dysuria, no hematuria  Musculoskeletal: arthralgias,-- myalgias,-- immobility or loss of function-- and-- head/neck/back pain  Integumentary skin lesion(s): [facility]:  no anxiety, no depression, no mood swings, no psychiatric hospitalizations, no sleep problems  Endocrine  no unusual weight loss or gain, no excessive urination, no excessive thirst, no hair loss or gain, no hot or cold intolerance, no menstrual period change or irregularity, no loss of sexual ability or drive, no erection difficulty, no nipple discharge  Hematologic/Lymphatic:  no unusual bleeding, no tendency for easy bruising, no clotting skin or lumps  Neurological General:  no headache, no nausea or vomiting, no lightheadedness, no convulsions, no blackouts, no syncope, no trauma, no photopsia, no increased sleepiness, no trouble falling asleep, no snoring, no awakening at night  Neurological Mental Status:  no confusion, no mood swings, no alteration or loss of consciousness, no difficulty expressing/understanding speech, no memory problems  Neurological Cranial Nerves: taste or smell loss/changes  Neurological Motor findings include:  no tremor, no twitching, no cramping(pre/post exercise), no atrophy  Neurological Coordination:  no unsteadiness, no vertigo or dizziness, no clumsiness, no problems reaching for objects  Neurological Sensory: numbness        Neurological Gait:  no difficulty walking, not falling to one side, no sensation of being pushed, has not had falls  Active Problems   1  Acute deep vein thrombosis of lower extremity (453 40) (I82 409)   2  Acute pain of right knee (719 46) (M25 561)   3  Chronic intermittent steroid use (V58 65)   4  Chronic venous embolism and thrombosis of deep vessels of lower extremity (453 50)     (I82 509)   5  Decubitus ulcer, unspecified pressure ulcer stage   6  Foot-drop (736 79) (M21 379)   7  Functional quadriplegia (780 72) (R53 2)   8  Hyponatremia (276 1) (E87 1)   9  Hypotension (458 9) (I95 9)   10  Hypothyroidism (244 9) (E03 9)   11  Ingrowing nail (703 0) (L60 0)   12  Leg weakness (729 89) (M62 81)   13  Leukopenia (288 50) (D72 819)   14  Macular degeneration (362 50) (H35 30)   15  Monocytosis (288 63) (D72 821)   16  Muscle weakness (728 87) (M62 81)   17  Myasthenia gravis (358 00) (G70 00)   18  Neurogenic bladder (596 54) (N31 9)   19  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)   20  Non Hodgkin's lymphoma (202 80) (C85 90)   21  Orthostatic hypotension (458 0) (I95 1)   22  Other depressive disorder (311) (F32 9)   23  Paronychia of toe (681 11) (L03 039)   24  Port-a-cath in place (V45 89) (D44 802)   25  Raynaud's syndrome without gangrene (443 0) (I73 00)   26  Right knee pain (719 46) (M25 561)   27  SIADH (syndrome of inappropriate ADH production) (253 6) (E22 2)   28  Swelling of extremity, left (729 81) (M79 89)   29  Urinary tract infection (599 0) (N39 0)   30  Vasovagal syncope (780 2) (R55)   31   Venous thrombosis (453 9) (I82 90)    Past Medical History    · History of Closed Fracture Of Multiple Ribs (807 09)   · History of Closed Pelvic Fracture (808 8)   · History of Fibrocystic breast disease, unspecified laterality (610 1) (N60 19)   · History of Fracture Of The Ankle (824 8)   · History of Fracture Of The Foot (827 0)   · History of Fracture Of The Vertebral Column (805 8)   · History of anemia (V12 3) (Z86 2)   · History of Left Shoulder Fracture (812 00)   · History of Septicemia (038 9) (A41 9)     The active problems and past medical history were reviewed and updated today  Surgical History    · History of Appendectomy   · History of Cardiac Catheter His Ablation   · History of Cataract Surgery   · History of Cholecystectomy   · History of Hysterectomy   · History of Incisional Breast Biopsy   · History of Knee Arthroscopy (Therapeutic)   · History of Neuroplasty Decompression Median Nerve At Carpal Tunnel   · History of Rotator Cuff Repair   · History of Splenectomy     The surgical history was reviewed and updated today  Family History   Mother    · Family history of Chronic Obstructive Pulmonary Disease   · Family history of Emphysema  Father    · Family history of Chronic Obstructive Pulmonary Disease   · Family history of Emphysema  Daughter    · Family history of Bipolar Disorder NOS   · Family history of Ovarian Cancer (V16 41)   · Family history of Suicide Completion  Brother    · Family history of Chronic Obstructive Pulmonary Disease   · Family history of Malignant Pancreatic Neoplasm  Maternal Grandmother    · Family history of Basal Cell Carcinoma Of The Skin     The family history was reviewed and updated today  Social History    · Denied: History of Alcohol Use (History)   · Daily Coffee Consumption (1  Cups/Day)   · Drinks coffee   · Marital History -  (V61 03)   · Never A Smoker   · Never Used Drugs   · Occupation: Retired   ·  and    · [address] history was reviewed and updated today  Current Meds    1  Brimonidine Tartrate 0 15 % Ophthalmic Solution; Therapy: (Recorded:83Fvq0930) to Recorded   2  Coumadin 5 MG Oral Tablet; Therapy: (Recorded:64Kwb8370) to Recorded   3  Dulcolax 10 MG Rectal Suppository; Therapy: (Recorded:84Fnb4931) to Recorded   4  Fleet Enema ENEM; Therapy: (Recorded:13Krl8000) to Recorded   5   Furosemide 40 MG Oral Tablet; TAKE 1 TABLET DAILY; Therapy: (Katja Barrios) to Recorded   6  Gabapentin 100 MG Oral Capsule; TAKE 1 CAPSULE ONCE DAILY; Therapy: 99Pfk0456 to (Last Rx:18Apr2017)  Requested for: 64TWB1572 Ordered   7  MiraLax Oral Powder; MIX 17 GM IN 8 OUNCES OF LIQUID AND DRINK 1 TO 2 TIMES     DAILY FOR CONSTIPATION; Therapy: (Recorded:31Aio4226) to Recorded   8  Mycophenolate Mofetil 500 MG Oral Tablet; TAKE 1 TABLET BY MOUTH ONCE A DAY; Therapy: 00Rki2848 to (Evaluate:48Eaa0494)  Requested for: 81ANG6983; Last     Rx:92Ens2478 Ordered   9  Potassium TABS; Therapy: (Katja Barrios) to Recorded   10  Venlafaxine HCl  MG Oral Capsule Extended Release 24 Hour; take 1 capsule      daily; Therapy: 97Syq9064 to (Evaluate:17Jun2018)  Requested for: 43AGS3961; Last      Rx:61Obp5820 Ordered   11  Vitamin D (Ergocalciferol) 49226 UNIT Oral Capsule; TAKE ONE CAPSULE BY MOUTH      WEEKLY; Therapy: 44VCJ6146 to (Evaluate:94Zmt4138)  Requested for: 69YRJ7147; Last      Rx:83Mef2382; Status: ACTIVE - Transmit to [facility] Verification      Ordered   12  Vitamin D3 97818 UNIT Oral Capsule; Therapy: (Recorded:28Jan2016) to Recorded     The medication list was reviewed and updated today  Allergies   1  Rituxan CONC   2  Baclofen TABS   3  Cipro SOLN   4  Dilaudid TABS   5  Penicillins   6  Talwin SOLN   7  Ultram TABS    Vitals    Recorded: 46Omf7209 02:19PM   Heart Rate 84   Respiration 15   Systolic 212   Diastolic 60   Height 5 ft 7 in   Weight 196 lb    BMI Calculated 30 7   BSA Calculated 2     Physical Exam   Left Foot: Appearance: Normal except as noted: pes cavus  Right Foot: Appearance: Normal except as noted: excessive pronation  Right hallux mild bruising mild swelling erythema right hallux to a MPJ  Right 2nd digit nail is absent  Right hallux nail is loose  Nail bed was explored after removal of nail   There was superficial granular wound but no nail bed laceration no exposed bone  No abscess  Left Ankle: ROM: limited ROM in all planes Motor: diffuse weakness  Right Ankle: ROM: limited ROM in all planes Motor: diffuse weakness  Neurological Exam: Light touch was absent bilaterally  Vibratory sensation was absent bilaterally  Response to monofilament test was absent bilaterally  Deep tendon reflexes: achilles reflex absent bilateraly-- and-- Patient has dropfoot bilateral     Vascular Exam: performed Posterior tibial pulses were diminished bilaterally  Elevation Pallor: present bilaterally  Dependence rubor was diminished bilaterally  Toenails: All of the toenails were elongated-- and-- hypertrophied  Both first toenails were ingrown-- and-- tender  The right first toenail was Fibular nail groove has paronychia and ingrown toenail  Hyperkeratosis: Xerosis of skin noted, but-- none  Shoe Gear Evaluation: performed ()  Recommendation(s): AFO orthotics-- and-- A she will need night splints in order to maintain dorsiflexion of ankle  Procedure   Because of advanced neuropathy no anesthesia was used  Under sterile technique  The entire right hallux nail was removed using a [place] elevator and hemostat  Applied silver nitrate, Silvadene and dry sterile dressing  Patient was instructed on dressing changes  Dressing was also applied to the right 2nd digit where the nail was absent  No nail bed laceration or abscess was noted  Future Appointments      Date/Time Provider Specialty Site   12/26/2017 11:30 AM Haylee Pineda DPM Podiatry [address] [facility]    07/16/2018 01:00 PM LAMINE Hall  Hematology On[facility] [place]     Signatures    Electronically signed by :  Abdirashid Kapadia DPM; Dec 22 2017  2:42PM EST                       (Author)

## 2017-12-26 ENCOUNTER — GENERIC CONVERSION - ENCOUNTER (OUTPATIENT)
Dept: OTHER | Facility: OTHER | Age: 77
End: 2017-12-26

## 2018-01-09 ENCOUNTER — GENERIC CONVERSION - ENCOUNTER (OUTPATIENT)
Dept: OTHER | Facility: OTHER | Age: 78
End: 2018-01-09

## 2018-01-10 NOTE — RESULT NOTES
Message   please let pt know labs still with some anemia with hb at 10 8, slighly lower than last lab   also abs lymphs slightly down again since last lab  cont with present dose of cellcept  pt to get monthly labs  please make sure phlebotomy is coming out to the home  we will await march labs to compare  low threshold to see pcp for an y infection, fever ,etc     Verified Results  (1) CBC/PLT/DIFF 33GAS4060 07:55PM Doris Hard     Test Name Result Flag Reference   WBC COUNT 5 92 Thousand/uL  4 31-10 16   RBC COUNT 3 44 Million/uL L 3 81-5 12   HEMOGLOBIN 10 8 g/dL L 11 5-15 4   HEMATOCRIT 34 8 %  34 8-46  1    2 fL H 82 0-98 0   MCH 31 4 pg  26 8-34 3   MCHC 31 0 g/dL L 31 4-37 4   RDW 14 6 %  11 6-15 1   MPV 11 1 fL  8 9-12 7   PLATELET COUNT 502 Thousands/uL  149-390   nRBC AUTOMATED 0 /100 WBCs     NEUTROPHILS RELATIVE PERCENT 67 %  43-75   LYMPHOCYTES RELATIVE PERCENT 11 % L 14-44   MONOCYTES RELATIVE PERCENT 16 % H 4-12   EOSINOPHILS RELATIVE PERCENT 6 %  0-6   BASOPHILS RELATIVE PERCENT 0 %  0-1   NEUTROPHILS ABSOLUTE COUNT 3 98 Thousands/µL  1 85-7 62   LYMPHOCYTES ABSOLUTE COUNT 0 62 Thousands/µL  0 60-4 47   MONOCYTES ABSOLUTE COUNT 0 95 Thousand/µL  0 17-1 22   EOSINOPHILS ABSOLUTE COUNT 0 34 Thousand/µL  0 00-0 61   BASOPHILS ABSOLUTE COUNT 0 01 Thousands/µL  0 00-0 10

## 2018-01-11 NOTE — RESULT NOTES
Message   let pt know t4 slightly elevated    send to pcp and pt needs to follow up with pcp     Verified Results  (1) T4, FREE 55Ala2926 11:30AM Annel Martin     Test Name Result Flag Reference   T4,FREE 1 63 ng/dL H 0 76-1 46

## 2018-01-11 NOTE — RESULT NOTES
Message   please let pt know abs lymohs low at 0 62 about the same as one week ago  not sure why pt is getting weekly labs?  ok to get monthly for the next 12 mos  please make sure she is aware monthly ok and also phlebotomy team for home lab draws aware aa well   dahiana coordinated for her in the past     Verified Results  (1) CBC/PLT/DIFF 27TNF1939 08:25PM Richard López     Test Name Result Flag Reference   WBC COUNT 6 33 Thousand/uL  4 31-10 16   RBC COUNT 3 62 Million/uL L 3 81-5 12   HEMOGLOBIN 11 4 g/dL L 11 5-15 4   HEMATOCRIT 36 8 %  34 8-46  1    fL H 82-98   MCH 31 5 pg  26 8-34 3   MCHC 31 0 g/dL L 31 4-37 4   RDW 14 8 %  11 6-15 1   MPV 11 1 fL  8 9-12 7   PLATELET COUNT 044 Thousands/uL  149-390   nRBC AUTOMATED 0 /100 WBCs     NEUTROPHILS RELATIVE PERCENT 67 %  43-75   LYMPHOCYTES RELATIVE PERCENT 10 % L 14-44   MONOCYTES RELATIVE PERCENT 18 % H 4-12   EOSINOPHILS RELATIVE PERCENT 5 %  0-6   BASOPHILS RELATIVE PERCENT 0 %  0-1   NEUTROPHILS ABSOLUTE COUNT 4 20 Thousands/µL  1 85-7 62   LYMPHOCYTES ABSOLUTE COUNT 0 63 Thousands/µL  0 60-4 47   MONOCYTES ABSOLUTE COUNT 1 16 Thousand/µL  0 17-1 22   EOSINOPHILS ABSOLUTE COUNT 0 30 Thousand/µL  0 00-0 61   BASOPHILS ABSOLUTE COUNT 0 02 Thousands/µL  0 00-0 10

## 2018-01-12 NOTE — RESULT NOTES
Verified Results  * DXA BONE DENSITY SPINE HIP AND PELVIS 89Kkw1630 10:14AM Joe Hernandez Order Number: [id number]    - Patient Instructions: To schedule this appointment, please contact Central Scheduling at [phone]  Test Name Result Flag Reference   DXA BONE DENSITY SPINE HIP AND PELVIS (Report)     CENTRAL DXA SCAN     CLINICAL HISTORY:  68year old post-menopausal  female risk factors include secondary osteoporosis  Quadriplegia  TECHNIQUE: Bone densitometry was performed using a [place] bone densitometer  Regions of interest appear properly placed  There are no obvious fractures or other confounding variables which could limit the study  Degenerative changes of the lumbar    spine and hip  This will falsely elevate the bone mineral densities in these regions  COMPARISON: November 3, 2014     RESULTS:    LUMBAR SPINE: L1, L2 and L4:   BMD 1 336 gm/cm2   T-score 1 2   Z-score 3 0     LEFT TOTAL HIP:   BMD 0 570 gm/cm2   T-score -3 5   Z-score -1 6     LEFT FEMORAL NECK:   BMD 0 603 gm/cm2   T-score -3 1   Z-score -1 1     TOTAL RIGHT HIP:        BMD 0 554 gm/sq-cm,      T-score is -3 6      Z-score is -1 8                FEMORAL NECK RIGHT HIP :     BMD 0 596 gm/sq-cm,     T-score is -3 2     Z-score is -1 2             IMPRESSION:   1  Based on the [facility] classification, the T-score of -3 6 in the right hip is consistent with osteoporosis  2  When compared to the prior examination, there has been a 14 % INCREASE in the total bone mineral density of the lumbar spine and a 7 % DECREASE in the mean total bone mineral density of the hips  The increase in bone mineral density seen in the    lumbar spine is likely falsely elevated due to progression of degenerative changes within this region   When follow-up densitometry is performed, it is recommended that bone mineral density of the forearm also be assessed, due to the advanced    degenerative changes seen in the lumbar spine and hip  Bone densitometry of the forearm is less susceptible to degenerative disc and degenerative joint disease  3  According to the [facility], prescription therapy is recommended with a T-score of -2 5 or less in the spine or hip after appropriate evaluation to exclude secondary causes  4  A daily intake of at least 1200 mg Calcium and 800 to 1000 IU of Vitamin D, as well as weight bearing and muscle strengthening exercise, fall prevention and avoidance of tobacco and excessive alcohol intake as basic preventive measures are    suggested  5  Repeat DXA in 18 - 24 months, on the same machine, as clinically indicated  The 10 year risk of hip fracture is 9%, with the 10 year risk of major osteoporotic fracture being 22%, as calculated by the [facility] fracture risk assessment tool (FRAX)  The current NOF guidelines recommend treating patients with FRAX 10 year risk score of   >3% for hip fracture and >20% for major osteoporotic fracture        WHO CLASSIFICATION:   Normal (a T-score of -1 0 or higher)   Low bone mineral density (a T-score of less than -1 0 but higher than -2 5)   Osteoporosis (a T-score of -2 5 or less)   Severe osteoporosis (a T-score of -2 5 or less with a fragility fracture)             Workstation performed: [id number]     Signed by:   Stefan Alicea DO   9/11/17

## 2018-01-12 NOTE — RESULT NOTES
Message   Pls let pt know albumin is low on labs  Would discuss nutrition status with PCP, as this typically indicates poor nutritional status     Verified Results  (1) CBC/PLT/DIFF [id number] 07:20PM Beto Huff Order Number: [id number]     Test Name Result Flag Reference   WBC COUNT 6 56 Thousand/uL  4 31-10 16   RBC COUNT 3 68 Million/uL L 3 81-5 12   HEMOGLOBIN 11 3 g/dL L 11 5-15 4   HEMATOCRIT 36 7 %  34 8-46  1    fL H 82-98   MCH 30 7 pg  26 8-34 3   MCHC 30 8 g/dL L 31 4-37 4   RDW 15 4 % H 11 6-15 1   MPV 11 1 fL  8 9-12 7   PLATELET COUNT 000 Thousands/uL  149-390   nRBC AUTOMATED 0 /100 WBCs     NEUTROPHILS RELATIVE PERCENT 66 %  43-75   LYMPHOCYTES RELATIVE PERCENT 13 % L 14-44   MONOCYTES RELATIVE PERCENT 15 % H 4-12   EOSINOPHILS RELATIVE PERCENT 5 %  0-6   BASOPHILS RELATIVE PERCENT 1 %  0-1   NEUTROPHILS ABSOLUTE COUNT 4 35 Thousands/?L  1 85-7 62   LYMPHOCYTES ABSOLUTE COUNT 0 85 Thousands/?L  0 60-4 47   MONOCYTES ABSOLUTE COUNT 0 97 Thousand/?L  0 17-1 22   EOSINOPHILS ABSOLUTE COUNT 0 34 Thousand/?L  0 00-0 61   BASOPHILS ABSOLUTE COUNT 0 03 Thousands/?L  0 00-0 10   - Patient Instructions: This bloodwork is non-fasting  Please drink two glasses of water morning of bloodwork  - Patient Instructions: This bloodwork is non-fasting  Please drink two glasses of water morning of bloodwork  (1) COMPREHENSIVE METABOLIC PANEL [id number] 45:17WH See Poon    Order Number: [id number]     Test Name Result Flag Reference   GLUCOSE,RANDM 66 mg/dL     If the patient is fasting, the ADA then defines impaired fasting glucose as > 100 mg/dL and diabetes as > or equal to 123 mg/dL     SODIUM 140 mmol/L  136-145   POTASSIUM 3 6 mmol/L  3 5-5 3   CHLORIDE 101 mmol/L  100-108   CARBON DIOXIDE 31 mmol/L  21-32   ANION GAP (CALC) 8 mmol/L  4-13   BLOOD UREA NITROGEN 17 mg/dL  5-25   CREATININE 0 85 mg/dL  0 60-1 30   Standardized to IDMS reference method   CALCIUM 8 9 mg/dL  8 3-10 1   BILI, TOTAL 0 19 mg/dL L 0 20-1 00   ALK PHOSPHATAS 136 U/L H    ALT (SGPT) 16 U/L  12-78   AST(SGOT) 20 U/L  5-45   ALBUMIN 2 9 g/dL L 3 5-5 0   TOTAL PROTEIN 6 9 g/dL  6 4-8 2   eGFR Non-African American      >60 0 ml/min/1 73sq m   [facility] Disease Education Program recommendations are as follows:  GFR calculation is accurate only with a steady state creatinine  Chronic Kidney disease less than 60 ml/min/1 73 sq  meters  Kidney failure less than 15 ml/min/1 73 sq  meters

## 2018-01-13 VITALS
SYSTOLIC BLOOD PRESSURE: 134 MMHG | HEART RATE: 78 BPM | BODY MASS INDEX: 30.76 KG/M2 | RESPIRATION RATE: 16 BRPM | HEIGHT: 67 IN | WEIGHT: 196 LBS | DIASTOLIC BLOOD PRESSURE: 88 MMHG

## 2018-01-13 NOTE — MISCELLANEOUS
Message  GI Reminder Recall [employer]:   Date: 03/30/2016   Dear Rita Arcos:     Review of our records shows you are due for the following: Colonoscopy  Please call the following office to schedule your appointment:   [address] [place] [address], [address], [address] [place] [address]  [phone]  We look forward to hearing from you! Sincerely,         Signatures   Electronically signed by :  Kadie Lal, ; Mar 30 2016  1:07PM EST                       (Author)

## 2018-01-13 NOTE — RESULT NOTES
Verified Results  XR KNEE 1 OR 2 VIEW RIGHT [id number] 08:09AM Doris Hard     Test Name Result Flag Reference   XR KNEE 1 OR 2 VW RIGHT (Report)     RIGHT KNEE     INDICATION: Pain and swelling     COMPARISON: 07/09/2014     VIEWS: AP and lateral     IMAGES: 2     FINDINGS:     There is no acute fracture or dislocation  There is no joint effusion  Severe tricompartmental osteoarthritis  Joint space narrowing is significantly worse compared to the prior study  Large spurs extending anteriorly from the proximal tibia  No lytic or blastic lesions are seen  Soft tissues are unremarkable  IMPRESSION:     No acute osseous abnormality  Severe osteoarthritis significantly worse compared to the prior study         Workstation performed: [id number]     Signed by:   Umm Short MD   8/17/17       Plan  Acute pain of right knee, Right knee pain    · *1 - [facility]  *  Status: Hold For - Scheduling  Requested  for: 97GIO3506  Care Summary provided  : Yes

## 2018-01-14 VITALS — HEIGHT: 67 IN | DIASTOLIC BLOOD PRESSURE: 62 MMHG | SYSTOLIC BLOOD PRESSURE: 132 MMHG | RESPIRATION RATE: 14 BRPM

## 2018-01-14 VITALS — RESPIRATION RATE: 10 BRPM | SYSTOLIC BLOOD PRESSURE: 107 MMHG | HEART RATE: 89 BPM | DIASTOLIC BLOOD PRESSURE: 60 MMHG

## 2018-01-14 NOTE — MISCELLANEOUS
Message   Recorded as Task   Date: 08/21/2017 10:09 AM, Created By: Zaynab Maciel   Task Name: Call Back   Assigned To: Carla Henderson   Regarding Patient: Shawn Clemens, Status: Active   Comment:    Zaynab Maciel - 21 Aug 2017 10:09 AM     TASK CREATED  Caller: Self; [phone] (Home)  Pt calling for CT results from last week  Returned call to patient - explained that scan looked goo - no lymphoma      Active Problems    1  Acute deep vein thrombosis of lower extremity (453 40) (I82 409)   2  Acute pain of right knee (719 46) (M25 561)   3  Chronic intermittent steroid use (V58 65)   4  Chronic venous embolism and thrombosis of deep vessels of lower extremity (453 50)   (I82 509)   5  Decubitus ulcer, unspecified pressure ulcer stage   6  Foot-drop (736 79) (M21 379)   7  Functional quadriplegia (780 72) (R53 2)   8  Hyponatremia (276 1) (E87 1)   9  Hypotension (458 9) (I95 9)   10  Hypothyroidism (244 9) (E03 9)   11  Ingrowing nail (703 0) (L60 0)   12  Leg weakness (729 89) (M62 81)   13  Leukopenia (288 50) (D72 819)   14  Macular degeneration (362 50) (H35 30)   15  Monocytosis (288 63) (D72 821)   16  Muscle weakness (728 87) (M62 81)   17  Myasthenia gravis (358 00) (G70 00)   18  Neurogenic bladder (596 54) (N31 9)   19  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)   20  Non Hodgkin's lymphoma (202 80) (C85 90)   21  Orthostatic hypotension (458 0) (I95 1)   22  Other depressive disorder (311) (F32 9)   23  Paronychia of toe (681 11) (L03 039)   24  Port-a-cath in place (V45 89) (Z37 875)   25  Raynaud's syndrome without gangrene (443 0) (I73 00)   26  Right knee pain (719 46) (M25 561)   27  SIADH (syndrome of inappropriate ADH production) (253 6) (E22 2)   28  Swelling of extremity, left (729 81) (M79 89)   29  Urinary tract infection (599 0) (N39 0)   30  Vasovagal syncope (780 2) (R55)   31  Venous thrombosis (453 9) (I82 90)    Current Meds   1   Amitriptyline HCl - 50 MG Oral Tablet; TAKE 1 TABLET AT BEDTIME  Requested for:   53ECN8598; Last Rx:58Bcv5393 Ordered   2  Brimonidine Tartrate 0 15 % Ophthalmic Solution; Therapy: (Recorded:74Ofz7174) to Recorded   3  Coumadin 5 MG Oral Tablet (Warfarin Sodium); Therapy: (Recorded:81Vfo0381) to Recorded   4  Dulcolax 10 MG Rectal Suppository; Therapy: (Recorded:64Gec5779) to Recorded   5  Fleet Enema ENEM; Therapy: (Recorded:22Vfh1244) to Recorded   6  Furosemide 40 MG Oral Tablet; TAKE 1 TABLET DAILY; Therapy: (Ai Grumbles) to Recorded   7  Gabapentin 100 MG Oral Capsule; TAKE 1 CAPSULE ONCE DAILY; Therapy: 18Apr2017 to (Last Rx:18Apr2017)  Requested for: 10Aug2017 Ordered   8  MiraLax Oral Powder (Polyethylene Glycol 3350); MIX 17 GM IN 8 OUNCES OF LIQUID   AND DRINK 1 TO 2 TIMES DAILY FOR CONSTIPATION; Therapy: (Recorded:44Bdn6030) to Recorded   9  Mycophenolate Mofetil 500 MG Oral Tablet; TAKE 1 TABLET BY MOUTH ONCE A DAY; Therapy: 48Flr1911 to (Wava Liz)  Requested for: 98Uwo4943; Last   Rx:13Dus1520; Status: ACTIVE - Transmit to [facility] Verification Ordered   10  Potassium TABS; Therapy: (Ai Grumbles) to Recorded   11  Venlafaxine HCl ER 75 MG Oral Capsule Extended Release 24 Hour (Effexor XR); TAKE    1 CAPSULE DAILY; Therapy: 01HMF4590 to (Evaluate:20Fcj4295)  Requested for: 18LKT5953; Last    Rx:01Hlg2042 Ordered   12  Vitamin D (Ergocalciferol) 22769 UNIT Oral Capsule; TAKE ONE CAP WEEKLY FOR 8    WEEKS; Therapy: 95JNX4690 to (Evaluate:06Wfy5856)  Requested for: 10Aug2017; Last    Rx:10Aug2017; Status: ACTIVE - Transmit to [facility] Verification    Ordered   13  Vitamin D3 49704 UNIT Oral Capsule; Therapy: (Recorded:28Jan2016) to Recorded    Allergies    1  Rituxan CONC   2  Baclofen TABS   3  Cipro SOLN   4  Dilaudid TABS   5  Penicillins   6  Talwin SOLN   7   Ultram TABS    Signatures   Electronically signed by : Shabana Jama, ; Aug 21 2017 10:30AM EST (Author)

## 2018-01-15 VITALS
TEMPERATURE: 97.6 F | SYSTOLIC BLOOD PRESSURE: 112 MMHG | HEART RATE: 72 BPM | DIASTOLIC BLOOD PRESSURE: 60 MMHG | RESPIRATION RATE: 6 BRPM

## 2018-01-17 NOTE — MISCELLANEOUS
Message   Recorded as Task   Date: 08/07/2017 12:20 PM, Created By: Sharon Mejia   Task Name: Care Coordination   Assigned To: Evita Rutherford   Regarding Patient: Deirdre Fung, Status: In Progress   Zoya Plan - 07 Aug 2017 12:20 PM     TASK CREATED  Caller: Self; Care Coordination; [phone] (Home)  stated that she is not feeling any better and that Dr Daniele Amos stated that if she wasnt feeling better that he would order a CT scan of her lung  Rq a call back at [address] Aug 2017 12:22 PM     TASK IN [address] - 07 Aug 2017 12:22 PM     TASK EDITED  please advise   Duong Burrell) - 10 Aug 2017 7:13 AM     TASK REASSIGNED: Previously Assigned To Jerri Newsome  Plan we ordered a CT scan of the chest with IV contrast if her creatinine is good   Evita Rutherford - 10 Aug 2017 7:31 AM     TASK EDITED  albert MONTEIRO, will schedule CT scan   Evita Rutherford - 10 Aug 2017 7:32 AM     TASK REASSIGNED: Previously Assigned To Evita Rutherford  please schedule patient for CT of chest and f/u with Dr Daniele Amos    Thanks! Marvin Lowe - 10 Aug 2017 8:38 AM     TASK REPLIED TO: Previously Assigned To Marvin Lowe  CT for 8 16 17 & F/up with Dr Daniele Amos on 8 30 17  Pt is aware   Evita Rutherford - 10 Aug 2017 10:00 AM     TASK EDITED        Active Problems    1  Acute deep vein thrombosis of lower extremity (453 40) (I82 409)   2  Chronic intermittent steroid use (V58 65)   3  Chronic venous embolism and thrombosis of deep vessels of lower extremity (453 50)   (I82 509)   4  Decubitus ulcer, unspecified pressure ulcer stage   5  Foot-drop (736 79) (M21 379)   6  Functional quadriplegia (780 72) (R53 2)   7  Hyponatremia (276 1) (E87 1)   8  Hypotension (458 9) (I95 9)   9  Hypothyroidism (244 9) (E03 9)   10  Ingrowing nail (703 0) (L60 0)   11  Leg weakness (729 89) (M62 81)   12  Leukopenia (288 50) (D72 819)   13   Macular degeneration (362 50) (H35 30) 14  Monocytosis (288 63) (D72 821)   15  Muscle weakness (728 87) (M62 81)   16  Myasthenia gravis (358 00) (G70 00)   17  Neurogenic bladder (596 54) (N31 9)   18  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)   19  Non Hodgkin's lymphoma (202 80) (C85 90)   20  Orthostatic hypotension (458 0) (I95 1)   21  Other depressive disorder (311) (F32 9)   22  Paronychia of toe (681 11) (L03 039)   23  Port-a-cath in place (V45 89) (Z03 376)   24  Raynaud's syndrome without gangrene (443 0) (I73 00)   25  SIADH (syndrome of inappropriate ADH production) (253 6) (E22 2)   26  Swelling of extremity, left (729 81) (M79 89)   27  Urinary tract infection (599 0) (N39 0)   28  Venous thrombosis (453 9) (I82 90)    Current Meds   1  Amitriptyline HCl - 50 MG Oral Tablet; TAKE 1 TABLET AT BEDTIME  Requested for:   40GOU1501; Last Rx:52Tot5457 Ordered   2  Brimonidine Tartrate 0 15 % Ophthalmic Solution; Therapy: (Recorded:75Osp0077) to Recorded   3  Coumadin 5 MG Oral Tablet (Warfarin Sodium); Therapy: (Recorded:32Dig9026) to Recorded   4  Dulcolax 10 MG Rectal Suppository; Therapy: (Recorded:12Lnz4538) to Recorded   5  Fleet Enema ENEM; Therapy: (Recorded:45Aob7959) to Recorded   6  Furosemide 40 MG Oral Tablet; TAKE 1 TABLET DAILY; Therapy: (Austine Precise) to Recorded   7  Gabapentin 100 MG Oral Capsule; TAKE 1 CAPSULE ONCE DAILY; Therapy: 20Apf1855 to (Last Rx:19Rsf1433)  Requested for: 36Wrf0549 Ordered   8  MiraLax Oral Powder (Polyethylene Glycol 3350); MIX 17 GM IN 8 OUNCES OF LIQUID   AND DRINK 1 TO 2 TIMES DAILY FOR CONSTIPATION; Therapy: (Recorded:05Vui3226) to Recorded   9  Mycophenolate Mofetil 500 MG Oral Tablet; TAKE 1 TABLET BY MOUTH ONCE A DAY; Therapy: 40Tzs5454 to (Sasha Morgan)  Requested for: 57Pah1198; Last   Rx:91Iyi7651; Status: ACTIVE - Transmit to [facility] Verification Ordered   10  Nystatin 541541 UNIT/ML Mouth/Throat Suspension;     Therapy: (Recorded:11Khb9110) to Recorded 11  Potassium TABS; Therapy: (Franc Benitez) to Recorded   12  Venlafaxine HCl ER 75 MG Oral Capsule Extended Release 24 Hour (Effexor XR); TAKE    1 CAPSULE DAILY; Therapy: 71BHQ6900 to (Evaluate:16Sep2017)  Requested for: 58RAL5303; Last    Rx:56Ppg8403 Ordered   13  Vitamin D3 43466 UNIT Oral Capsule; Therapy: (Recorded:28Jan2016) to Recorded    Allergies    1  Rituxan CONC   2  Baclofen TABS   3  Cipro SOLN   4  Dilaudid TABS   5  Penicillins   6  Talwin SOLN   7   Ultram TABS    Signatures   Electronically signed by : Judith Gonzalez, ; Aug 10 2017 10:00AM EST                       (Author)

## 2018-01-22 VITALS — HEART RATE: 84 BPM | DIASTOLIC BLOOD PRESSURE: 60 MMHG | HEIGHT: 67 IN | SYSTOLIC BLOOD PRESSURE: 104 MMHG

## 2018-01-23 VITALS
SYSTOLIC BLOOD PRESSURE: 104 MMHG | WEIGHT: 196 LBS | HEART RATE: 84 BPM | BODY MASS INDEX: 30.76 KG/M2 | RESPIRATION RATE: 15 BRPM | HEIGHT: 67 IN | DIASTOLIC BLOOD PRESSURE: 60 MMHG

## 2018-01-24 VITALS — WEIGHT: 196 LBS | HEIGHT: 67 IN | BODY MASS INDEX: 30.76 KG/M2

## 2018-01-24 VITALS
WEIGHT: 196 LBS | BODY MASS INDEX: 38.48 KG/M2 | HEIGHT: 60 IN | HEART RATE: 84 BPM | RESPIRATION RATE: 15 BRPM | SYSTOLIC BLOOD PRESSURE: 104 MMHG | DIASTOLIC BLOOD PRESSURE: 60 MMHG

## 2018-01-25 ENCOUNTER — TELEPHONE (OUTPATIENT)
Dept: NEUROLOGY | Facility: CLINIC | Age: 78
End: 2018-01-25

## 2018-01-25 NOTE — TELEPHONE ENCOUNTER
Patient called back into office to report it was faxed to office earlier today but not in this writers name  Writer will keep an eye out in chart and make sure it gets to appropriate place if needed

## 2018-01-25 NOTE — TELEPHONE ENCOUNTER
Writer received voicemail from patient asking for return call  Patient needs M1 form for [employer]NAP benefits compeleted so she can continue to get help  Asked for return call to: [phone]  Writer returned call to patient  Discussed M1 form, she reported she had it completed by our office before  Patient will have son fax the form over to this writers attention  Writer did inform patient doctor's have a 2 week turn around time for paper but will make sure it gets completed by the correct parties once received  Will return call once completed

## 2018-01-26 ENCOUNTER — TELEPHONE (OUTPATIENT)
Dept: NEUROLOGY | Facility: CLINIC | Age: 78
End: 2018-01-26

## 2018-02-05 NOTE — TELEPHONE ENCOUNTER
Writer contacted patient  She reported that [facility] sent the form to patient's PCP, and they no longer need our assistance  Will contact office if they do

## 2018-03-09 DIAGNOSIS — E55.9 VITAMIN D DEFICIENCY: Primary | ICD-10-CM

## 2018-03-09 RX ORDER — ERGOCALCIFEROL 1.25 MG/1
CAPSULE ORAL
Qty: 8 CAPSULE | Refills: 0 | Status: SHIPPED | OUTPATIENT
Start: 2018-03-09 | End: 2018-06-05 | Stop reason: SDUPTHER

## 2018-05-30 ENCOUNTER — OFFICE VISIT (OUTPATIENT)
Dept: PODIATRY | Facility: CLINIC | Age: 78
End: 2018-05-30
Payer: MEDICARE

## 2018-05-30 VITALS — DIASTOLIC BLOOD PRESSURE: 65 MMHG | SYSTOLIC BLOOD PRESSURE: 93 MMHG | HEIGHT: 68 IN

## 2018-05-30 DIAGNOSIS — B35.1 ONYCHOMYCOSIS: ICD-10-CM

## 2018-05-30 DIAGNOSIS — I70.209 PERIPHERAL ARTERIOSCLEROSIS (HCC): Primary | ICD-10-CM

## 2018-05-30 DIAGNOSIS — M79.671 PAIN IN BOTH FEET: ICD-10-CM

## 2018-05-30 DIAGNOSIS — M79.672 PAIN IN BOTH FEET: ICD-10-CM

## 2018-05-30 PROCEDURE — 11721 DEBRIDE NAIL 6 OR MORE: CPT | Performed by: PODIATRIST

## 2018-05-30 RX ORDER — BRIMONIDINE TARTRATE 0.15 %
DROPS OPHTHALMIC (EYE)
COMMUNITY
End: 2019-01-01

## 2018-05-30 RX ORDER — BRIMONIDINE TARTRATE 2 MG/ML
SOLUTION/ DROPS OPHTHALMIC 2 TIMES DAILY
COMMUNITY
End: 2019-01-01

## 2018-05-30 NOTE — PROGRESS NOTES
Procedures   Foot Exam patient has neuropathy  Patient has peripheral artery disease  Positive mycosis of nail  Plan  All mycotic nails debrided without pain or complication       Review of Systems        Constitutional: chills  HEENT: sinus problems,-- dryness of the eyes,-- tinnitus-- and-- hoarseness  Cardiovascular:  no chest pain or pressure, no palpitations present, the heart rate was not rapid or irregular, no swelling in the arms or legs, no poor circulation  Respiratory:  no unusual or persistant cough, no shortness of breath with or without exertion  Gastrointestinal: loss of bowel control  Genitourinary:  no incontinence, no feelings of urinary urgency, no increase in frequency, no urinary hesitancy, no dysuria, no hematuria  Musculoskeletal: arthralgias,-- myalgias,-- immobility or loss of function-- and-- head/neck/back pain  Integumentary skin lesion(s): Frances Vera Psychiatric:  no anxiety, no depression, no mood swings, no psychiatric hospitalizations, no sleep problems  Endocrine  no unusual weight loss or gain, no excessive urination, no excessive thirst, no hair loss or gain, no hot or cold intolerance, no menstrual period change or irregularity, no loss of sexual ability or drive, no erection difficulty, no nipple discharge  Hematologic/Lymphatic:  no unusual bleeding, no tendency for easy bruising, no clotting skin or lumps  Neurological General:  no headache, no nausea or vomiting, no lightheadedness, no convulsions, no blackouts, no syncope, no trauma, no photopsia, no increased sleepiness, no trouble falling asleep, no snoring, no awakening at night  Neurological Mental Status:  no confusion, no mood swings, no alteration or loss of consciousness, no difficulty expressing/understanding speech, no memory problems  Neurological Cranial Nerves: taste or smell loss/changes        Neurological Motor findings include:  no tremor, no twitching, no cramping(pre/post exercise), no atrophy  Neurological Coordination:  no unsteadiness, no vertigo or dizziness, no clumsiness, no problems reaching for objects  Neurological Sensory: numbness  Neurological Gait:  no difficulty walking, not falling to one side, no sensation of being pushed, has not had falls  Active Problems   1  Acute deep vein thrombosis of lower extremity (453 40) (I82 409)   2  Acute pain of right knee (719 46) (M25 561)   3  Chronic intermittent steroid use (V58 65)   4  Chronic venous embolism and thrombosis of deep vessels of lower extremity (453 50)     (I82 509)   5  Decubitus ulcer, unspecified pressure ulcer stage   6  Foot-drop (736 79) (M21 379)   7  Functional quadriplegia (780 72) (R53 2)   8  Hyponatremia (276 1) (E87 1)   9  Hypotension (458 9) (I95 9)   10  Hypothyroidism (244 9) (E03 9)   11  Ingrowing nail (703 0) (L60 0)   12  Leg weakness (729 89) (M62 81)   13  Leukopenia (288 50) (D72 819)   14  Macular degeneration (362 50) (H35 30)   15  Monocytosis (288 63) (D72 821)   16  Muscle weakness (728 87) (M62 81)   17  Myasthenia gravis (358 00) (G70 00)   18  Neurogenic bladder (596 54) (N31 9)   19  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)   20  Non Hodgkin's lymphoma (202 80) (C85 90)   21  Orthostatic hypotension (458 0) (I95 1)   22  Other depressive disorder (311) (F32 9)   23  Paronychia of toe (681 11) (L03 039)   24  Port-a-cath in place (V45 89) (A61 122)   25  Raynaud's syndrome without gangrene (443 0) (I73 00)   26  Right knee pain (719 46) (M25 561)   27  SIADH (syndrome of inappropriate ADH production) (253 6) (E22 2)   28  Swelling of extremity, left (729 81) (M79 89)   29  Urinary tract infection (599 0) (N39 0)   30  Vasovagal syncope (780 2) (R55)   31   Venous thrombosis (453 9) (I82 90)     Past Medical History    · History of Closed Fracture Of Multiple Ribs (807 09)   · History of Closed Pelvic Fracture (808 8)   · History of Fibrocystic breast disease, unspecified laterality (610 1) (N60 19)   · History of Fracture Of The Ankle (824 8)   · History of Fracture Of The Foot (827 0)   · History of Fracture Of The Vertebral Column (805 8)   · History of anemia (V12 3) (Z86 2)   · History of Left Shoulder Fracture (812 00)   · History of Septicemia (038 9) (A41 9)     The active problems and past medical history were reviewed and updated today  Surgical History    · History of Appendectomy   · History of Cardiac Catheter His Ablation   · History of Cataract Surgery   · History of Cholecystectomy   · History of Hysterectomy   · History of Incisional Breast Biopsy   · History of Knee Arthroscopy (Therapeutic)   · History of Neuroplasty Decompression Median Nerve At Carpal Tunnel   · History of Rotator Cuff Repair   · History of Splenectomy     The surgical history was reviewed and updated today  Family History   Mother    · Family history of Chronic Obstructive Pulmonary Disease   · Family history of Emphysema  Father    · Family history of Chronic Obstructive Pulmonary Disease   · Family history of Emphysema  Daughter    · Family history of Bipolar Disorder NOS   · Family history of Ovarian Cancer (V16 41)   · Family history of Suicide Completion  Brother    · Family history of Chronic Obstructive Pulmonary Disease   · Family history of Malignant Pancreatic Neoplasm  Maternal Grandmother    · Family history of Basal Cell Carcinoma Of The Skin     The family history was reviewed and updated today  Social History    · Denied: History of Alcohol Use (History)   · Daily Coffee Consumption (1  Cups/Day)   · Drinks coffee   · Marital History -  (V61 03)   · Never A Smoker   · Never Used Drugs   · Occupation: Retired   ·  and    · [facility] history was reviewed and updated today  Current Meds    1  Brimonidine Tartrate 0 15 % Ophthalmic Solution; Therapy: (Recorded:58Zqk8449) to Recorded   2  Coumadin 5 MG Oral Tablet; Therapy: (Recorded:05Rqx1526) to Recorded   3  Dulcolax 10 MG Rectal Suppository; Therapy: (Recorded:60Yxm7665) to Recorded   4  Fleet Enema ENEM; Therapy: (Recorded:89Ijv9934) to Recorded   5  Furosemide 40 MG Oral Tablet; TAKE 1 TABLET DAILY; Therapy: (Leticia Ast) to Recorded   6  Gabapentin 100 MG Oral Capsule; TAKE 1 CAPSULE ONCE DAILY; Therapy: 43Rih9591 to (Last Rx:46Vro5311)  Requested for: 89MLH1095 Ordered   7  MiraLax Oral Powder; MIX 17 GM IN 8 OUNCES OF LIQUID AND DRINK 1 TO 2 TIMES     DAILY FOR CONSTIPATION; Therapy: (Recorded:44Sxk6804) to Recorded   8  Mycophenolate Mofetil 500 MG Oral Tablet; TAKE 1 TABLET BY MOUTH ONCE A DAY; Therapy: 36Chv2449 to (Evaluate:73Ebg7608)  Requested for: 28SCJ0545; Last     Rx:89Dpq4108 Ordered   9  Potassium TABS; Therapy: (Leticia Ast) to Recorded   10  Venlafaxine HCl  MG Oral Capsule Extended Release 24 Hour; take 1 capsule      daily; Therapy: 28Cjq4016 to (Evaluate:17Jun2018)  Requested for: 34MEL4447; Last      Rx:06Kkj5780 Ordered   11  Vitamin D (Ergocalciferol) 84225 UNIT Oral Capsule; TAKE ONE CAPSULE BY MOUTH      WEEKLY; Therapy: 51WZG9286 to (Evaluate:78Awi0727)  Requested for: 09RBW8716; Last      Rx:16Nhi8451; Status: ACTIVE - Transmit to [facility]      Ordered   12  Vitamin D3 82838 UNIT Oral Capsule; Therapy: (Recorded:28Jan2016) to Recorded     The medication list was reviewed and updated today  Allergies   1  Rituxan CONC   2  Baclofen TABS   3  Cipro SOLN   4  Dilaudid TABS   5  Penicillins   6  Talwin SOLN   7  Ultram TABS     Vitals        Heart Rate 84   Respiration 15   Systolic 949   Diastolic 60   Height 5 ft 7 in   Weight 196 lb    BMI Calculated 30 7   BSA Calculated 2      Physical Exam   Left Foot: Appearance: Normal except as noted: pes cavus  Right Foot: Appearance: Normal except as noted: excessive pronation  Right hallux mild bruising mild swelling erythema right hallux to a MPJ  Right 2nd digit nail is absent  Right hallux nail is loose  Nail bed was explored after removal of nail  There was superficial granular wound but no nail bed laceration no exposed bone  No abscess  Left Ankle: ROM: limited ROM in all planes Motor: diffuse weakness  Right Ankle: ROM: limited ROM in all planes Motor: diffuse weakness  Neurological Exam: Light touch was absent bilaterally  Vibratory sensation was absent bilaterally  Response to monofilament test was absent bilaterally  Deep tendon reflexes: achilles reflex absent bilateraly-- and-- Patient has dropfoot bilateral     Vascular Exam: performed Posterior tibial pulses were diminished bilaterally  Elevation Pallor: present bilaterally  Dependence rubor was diminished bilaterally  Toenails: All of the toenails were elongated-- and-- hypertrophied  Both first toenails were ingrown-- and-- tender  The right first toenail was Fibular nail groove has paronychia and ingrown toenail  Hyperkeratosis: Xerosis of skin noted, but-- none  Shoe Gear Evaluation: performed ()  Recommendation(s): AFO orthotics-- and-- A she will need night splints in order to maintain dorsiflexion of ankle

## 2018-06-05 DIAGNOSIS — G36.0 NEUROMYELITIS OPTICA (DEVIC) (HCC): Primary | ICD-10-CM

## 2018-06-05 DIAGNOSIS — E55.9 VITAMIN D DEFICIENCY: ICD-10-CM

## 2018-06-07 RX ORDER — ERGOCALCIFEROL 1.25 MG/1
CAPSULE ORAL
Qty: 8 CAPSULE | Refills: 0 | Status: SHIPPED | OUTPATIENT
Start: 2018-06-07

## 2018-06-07 RX ORDER — MYCOPHENOLATE MOFETIL 500 MG/1
TABLET ORAL
Qty: 30 TABLET | Refills: 0 | Status: SHIPPED | OUTPATIENT
Start: 2018-06-07 | End: 2018-06-20 | Stop reason: SDUPTHER

## 2018-06-13 ENCOUNTER — TELEPHONE (OUTPATIENT)
Dept: NEUROLOGY | Facility: CLINIC | Age: 78
End: 2018-06-13

## 2018-06-13 DIAGNOSIS — G36.0 NEUROMYELITIS OPTICA (DEVIC) (HCC): Primary | ICD-10-CM

## 2018-06-13 NOTE — TELEPHONE ENCOUNTER
Patient states she spoke with someone last week who was going to send script for labwork to her home but she never received it  No order in EPIC  Does patient need labs before her appointment next week?     Fax script to visiting nurses: [phone]

## 2018-06-18 ENCOUNTER — APPOINTMENT (OUTPATIENT)
Dept: LAB | Facility: CLINIC | Age: 78
End: 2018-06-18
Payer: MEDICARE

## 2018-06-18 ENCOUNTER — TRANSCRIBE ORDERS (OUTPATIENT)
Dept: LAB | Facility: CLINIC | Age: 78
End: 2018-06-18

## 2018-06-18 DIAGNOSIS — G36.0 NEUROMYELITIS OPTICA (HCC): ICD-10-CM

## 2018-06-18 LAB
BASOPHILS # BLD AUTO: 0.02 THOUSANDS/ΜL (ref 0–0.1)
BASOPHILS NFR BLD AUTO: 0 % (ref 0–1)
EOSINOPHIL # BLD AUTO: 0.31 THOUSAND/ΜL (ref 0–0.61)
EOSINOPHIL NFR BLD AUTO: 6 % (ref 0–6)
ERYTHROCYTE [DISTWIDTH] IN BLOOD BY AUTOMATED COUNT: 14 % (ref 11.6–15.1)
HCT VFR BLD AUTO: 41.8 % (ref 34.8–46.1)
HGB BLD-MCNC: 12.7 G/DL (ref 11.5–15.4)
IMM GRANULOCYTES # BLD AUTO: 0.02 THOUSAND/UL (ref 0–0.2)
IMM GRANULOCYTES NFR BLD AUTO: 0 % (ref 0–2)
LYMPHOCYTES # BLD AUTO: 1.05 THOUSANDS/ΜL (ref 0.6–4.47)
LYMPHOCYTES NFR BLD AUTO: 20 % (ref 14–44)
MCH RBC QN AUTO: 32.2 PG (ref 26.8–34.3)
MCHC RBC AUTO-ENTMCNC: 30.4 G/DL (ref 31.4–37.4)
MCV RBC AUTO: 106 FL (ref 82–98)
MONOCYTES # BLD AUTO: 0.7 THOUSAND/ΜL (ref 0.17–1.22)
MONOCYTES NFR BLD AUTO: 14 % (ref 4–12)
NEUTROPHILS # BLD AUTO: 3.04 THOUSANDS/ΜL (ref 1.85–7.62)
NEUTS SEG NFR BLD AUTO: 60 % (ref 43–75)
NRBC BLD AUTO-RTO: 0 /100 WBCS
PLATELET # BLD AUTO: 317 THOUSANDS/UL (ref 149–390)
PMV BLD AUTO: 10.9 FL (ref 8.9–12.7)
RBC # BLD AUTO: 3.94 MILLION/UL (ref 3.81–5.12)
WBC # BLD AUTO: 5.14 THOUSAND/UL (ref 4.31–10.16)

## 2018-06-18 PROCEDURE — 85025 COMPLETE CBC W/AUTO DIFF WBC: CPT

## 2018-06-18 PROCEDURE — 36415 COLL VENOUS BLD VENIPUNCTURE: CPT

## 2018-06-20 ENCOUNTER — OFFICE VISIT (OUTPATIENT)
Dept: NEUROLOGY | Facility: CLINIC | Age: 78
End: 2018-06-20
Payer: MEDICARE

## 2018-06-20 VITALS — DIASTOLIC BLOOD PRESSURE: 53 MMHG | HEART RATE: 69 BPM | HEIGHT: 68 IN | SYSTOLIC BLOOD PRESSURE: 109 MMHG

## 2018-06-20 DIAGNOSIS — G62.9 POLYNEUROPATHY: ICD-10-CM

## 2018-06-20 DIAGNOSIS — G36.0 DEVIC'S DISEASE (HCC): Primary | ICD-10-CM

## 2018-06-20 DIAGNOSIS — G36.0 NEUROMYELITIS OPTICA (DEVIC) (HCC): ICD-10-CM

## 2018-06-20 PROCEDURE — 99214 OFFICE O/P EST MOD 30 MIN: CPT | Performed by: PHYSICIAN ASSISTANT

## 2018-06-20 RX ORDER — AMITRIPTYLINE HYDROCHLORIDE 25 MG/1
25 TABLET, FILM COATED ORAL
Qty: 30 TABLET | Refills: 3 | Status: SHIPPED | OUTPATIENT
Start: 2018-06-20 | End: 2018-12-13 | Stop reason: SDUPTHER

## 2018-06-20 RX ORDER — MYCOPHENOLATE MOFETIL 500 MG/1
500 TABLET ORAL DAILY
Qty: 30 TABLET | Refills: 3 | Status: SHIPPED | OUTPATIENT
Start: 2018-06-20 | End: 2018-12-13 | Stop reason: SDUPTHER

## 2018-06-20 NOTE — ASSESSMENT & PLAN NOTE
Continue gabapentin 600mg BID  She does not want to increase dose due to it causes blurry vision  Will re-start Amitriptyline 25mg    She has tolerated well in the past

## 2018-06-20 NOTE — ASSESSMENT & PLAN NOTE
Overall stable  No new symptoms or worsening of symptoms      Continue Cellcept 500mg daily  Get CBC and CMP done in 2 months  Make sure to cont follow up with optho, urology and PCP  Return in 4 months or sooner if needed

## 2018-06-20 NOTE — PATIENT INSTRUCTIONS
Continue cellcept 500mg once a day  Continue gabapentin 600mg twice a day  Will re-start 25mg at bedtime    This may help with the neuropathy   Get labs done in 2 months--CBC and CMP  Make sure you follow up with your ophthalmologist, urologist and PCP  Follow up in 4-6 months with Dr Irlanda Jacobson

## 2018-06-20 NOTE — PROGRESS NOTES
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 )  -     CBC and differential; Future  -     Comprehensive metabolic panel; Future    Neuromyelitis optica (devic) ([facility])  -     mycophenolate (CELLCEPT) 500 mg tablet; Take 1 tablet (500 mg total) by mouth daily    Polyneuropathy  -     amitriptyline (ELAVIL) 25 mg tablet; Take 1 tablet (25 mg total) by mouth daily at bedtime           Subjective:    HPI    67 y/o female presents for NMO follow up, last seen in December 2017  Today, patient reports she is overall doing well  She remains on CellCept 500 mg daily  Patient reports she is in contact with Dr Orlando Vegas as well  She had labs drawn on 6/18/18 with WBCs 5 14, abs lymphs 1 05  She notes some increased burning in the legs  She is currently taking gabapentin 600mg BID  She reports it can cause blurry vision, so she does not want to increase the dose  She previously took amitriptyline, but reports she has not been on that in a few months  She continues to follow with Urology for her suprapubic catheter, reports no issues with that  She denies any recent hospitalizations, illness, infection  She denies any vision changes she denies any trouble with speech or swallowing  Denies any major bowel or bladder changes  Review of past years includes admission in June 2014 for a syncopal episode   Pt was taken to [address] [place] [address] for eval  In the ED she was found to have BP of 105/69, sodium 131 and positive urine studies for UTI  She was found to be dehydrated and hyponatremic  She was admitted from 6/21/14 to 6/28/14  During her hospitalization, Dr Galicia of nephrology was following her case  He believed her sodium was low due to her Trileptal which was dosed at 600mg BID  She has since been titrated off Trileptal  Pt was also admitted from 8/7/14-8/13/14 for acute Devics symptoms  She woke up the day prior to her admission with the inability to move her legs as well as a burning sensation in legs, bowel and bladder incontinence    During her hospital stay she received MRI L-Spine & c-spine; which showed new T-spine lesion however no MRI t-spine or brain was ordered  She was treated with 5 days of IV steroids which has regained some movement in her legs  Also she was treated for left foot ulcer with concern for osteomyelitis  Pt has been hospitalized 2 additional times from 10/14/14-10/24/14 for left upper extremity and bilateral lower extremity weakness  She had MRI c-spine which reveals new demyelinating lesion as well as disease progression on MRI t-spine  She received 5 days of IV steroids followed up by 5 days of IVIG  She was then admitted for a second time from 11/26/14-11/28/14 for increased weakness in right leg and left upper extremity  She did not received any updated imaging and only received 1 dose of IV steroids in the ER  She was discharged to [facility] for continued rehab  From 1/2014 to 1/2015 pt has had 5 inpatient admissions for NMO flares: April 2014, June 2014, August 2014, October 2014, November 2014 and Jan 2015  Pt was hospitalized at [address] [place] [address] from 1/14/15-1/21/15 with SOB, Left LE weakness, wt gain  She was dx with NMO exacerbation, URI, Escherichia coli UTI, and SIADH   MRI of the thoracic spine revealed significant progression of disease at the upper thoracic cord, T1-T4, where diffuse cord enhancement was noted, new from prior study on 12/2014  MRI c-spine & l-spine were stable  She was given 5 doses of IVIG as well as 5 days of intravenous steroids  She was discharged back to [facility]  There have been discussions with Dr Hernandez at [facility] about possible Eculizumab trial, however she is not a candidate due to the fact that she is nonambulatory  She was on Cellcept 1,000mg BID, as well as Prednisone 20mg daily  Pt is getting frequent CBC w/diff as well as CMP  Shy Sinks At that time, starting WBC 5 36 & abs lymphs 0 8  April 2015, she described difficulty with increased muscle spasms from bilateral hips to her feet, as well as left hand numbness and swelling  Her diazepam was increased  Her prednisone was stopped  Unfortunately in August 2015, she developed further increased spasms in her lower extremity as well as left arm up  She noted increased weakness  She presented to the hospital  She had MRI Cervical Sequela of demyelinating disease at C5-6, C7, and upper thoracic spinal cord are less well-seen on today's study particularly at C7 and upper thoracic spinal cord  The C5-6 lesion has a grossly stable appearance from the prior study  Stable mild to moderate degenerative changes  MRI Brain 8/26/15: There are foci of T2 and FLAIR signal abnormality in the periventricular and subcortical white matter which are typical for microvascular disease in patients of this age group  Brainstem and cerebellum demonstrate normal signal  no abnormal enhancement  MRI Thoracic noted multifocal disease within the cord is stable save for the T10 level where a broad-based dorsal focus of signal abnormality has become more confluent  There is no pathologic enhancement to suggest acute inflammation  Minor degenerative changes without compressive cord or root disease  She was found to have a UTI  At that time, it was thought she was experiencing a Devics exacerbation and underwent 3 treatments of IVIG  She was transferred to [facility]  During her time at [facility], she was treated for lower extremity swelling, she had Doppler's done which were negative, her pain medication was also changed from gabapentin to Lyrica  From there, she was transferred to [facility]  Of note, in October, her absolute lymphocytes were noted to be decreased at 0 4  Dr Marko Cline was contacted, he was made aware, her CellCept was subsequently decreased to 500 mg twice a day  She has been home around November 2015  She missed a several follow-up office visits in the interim  Patient in hospital in march 2016 due to breakdown of skin in both legs  pt was admitted and needed wound vac  pt was told the decbutius was down to bone but no infection in the bone  pt notes significant healing since finally getting to wound team        The following portions of the patient's history were reviewed and updated as appropriate: current medications, past family history, past medical history, past social history, past surgical history and problem list          Objective:    Blood pressure 109/53, pulse 69, height 5' 8" (1 727 m)  Physical Exam   Constitutional: She appears well-developed and well-nourished  HENT:   Head: Normocephalic and atraumatic  Eyes: EOM are normal  Pupils are equal, round, and reactive to light  Cardiovascular: Intact distal pulses  peng LE edema   Neurological:   Reflex Scores:       Patellar reflexes are 1+ on the right side and 1+ on the left side  Achilles reflexes are 1+ on the right side and 1+ on the left side  Skin: Skin is warm and dry  Psychiatric: She has a normal mood and affect  Her speech is normal        Neurological Exam    Mental Status  The patient is alert and oriented to person, place, time, and situation  Her recent and remote memory are normal  Her speech is normal  Her language is fluent with no aphasia  She has normal attention span and concentration  She has a normal fund of knowledge      Cranial Nerves    CN II: The patient's visual acuity and visual fields are normal   CN III, IV, VI: The patient's pupils are equally round and reactive to light and ocular movements are normal   CN V: The patient has normal facial sensation  CN VII:  The patient has symmetric facial movement  CN VIII:  The patient's hearing is normal   CN IX, X: The patient has symmetric palate movement and normal gag reflex  CN XI: The patient's shoulder shrug strength is normal   CN XII: The patient's tongue is midline without atrophy or fasciculations  Motor  The patient has normal muscle bulk throughout  Her overall muscle tone is increased throughout  Specifically, the tone is increased in the right leg, increased in the left leg  Right                   Left   Shoulder abduction                   4                         4  Elbow flexion                            4                         4  Elbow extension                       4                         4  Hip flexion                                2                         0  Dorsiflexion                               1                         0      Sensory    decreased vibratory sensation peng LEs; pt does not feel pin pricks until level of abd  Reflexes  Deep tendon reflexes are 2+ and symmetric except as noted  Right                     Left  Patellar                                 1+                         1+  Achilles                                1+                         1+    Gait and Coordination    Non-ambulatory, in a power wheelchair  Past-pointing on left FTN         ROS:    Review of Systems   Constitutional: Positive for fatigue  Negative for appetite change and fever  HENT: Negative  Negative for hearing loss, tinnitus, trouble swallowing and voice change  Eyes: Negative  Negative for photophobia and pain  Respiratory: Negative  Negative for shortness of breath  Cardiovascular: Negative  Negative for palpitations  Gastrointestinal: Negative  Negative for nausea and vomiting  Endocrine: Negative  Negative for cold intolerance and heat intolerance  Genitourinary: Negative  Negative for dysuria, frequency and urgency  Musculoskeletal: Positive for neck pain  Negative for myalgias  Skin: Negative  Negative for rash  Neurological: Positive for tremors, weakness and numbness  Negative for dizziness, seizures, syncope, facial asymmetry, speech difficulty, light-headedness and headaches  Tingling  cramping   Hematological: Negative  Does not bruise/bleed easily  Psychiatric/Behavioral: Negative  Negative for confusion, hallucinations and sleep disturbance

## 2018-08-08 ENCOUNTER — TRANSCRIBE ORDERS (OUTPATIENT)
Dept: LAB | Facility: CLINIC | Age: 78
End: 2018-08-08

## 2018-08-08 ENCOUNTER — APPOINTMENT (OUTPATIENT)
Dept: LAB | Facility: CLINIC | Age: 78
End: 2018-08-08
Payer: MEDICARE

## 2018-08-08 DIAGNOSIS — G36.0 DEVIC'S DISEASE (HCC): ICD-10-CM

## 2018-08-08 LAB
ALBUMIN SERPL BCP-MCNC: 2.9 G/DL (ref 3.5–5)
ALP SERPL-CCNC: 118 U/L (ref 46–116)
ALT SERPL W P-5'-P-CCNC: 13 U/L (ref 12–78)
ANION GAP SERPL CALCULATED.3IONS-SCNC: 6 MMOL/L (ref 4–13)
AST SERPL W P-5'-P-CCNC: 15 U/L (ref 5–45)
BASOPHILS # BLD AUTO: 0.03 THOUSANDS/ΜL (ref 0–0.1)
BASOPHILS NFR BLD AUTO: 1 % (ref 0–1)
BILIRUB SERPL-MCNC: 0.23 MG/DL (ref 0.2–1)
BUN SERPL-MCNC: 21 MG/DL (ref 5–25)
CALCIUM SERPL-MCNC: 8.9 MG/DL (ref 8.3–10.1)
CHLORIDE SERPL-SCNC: 102 MMOL/L (ref 100–108)
CO2 SERPL-SCNC: 29 MMOL/L (ref 21–32)
CREAT SERPL-MCNC: 0.83 MG/DL (ref 0.6–1.3)
EOSINOPHIL # BLD AUTO: 0.32 THOUSAND/ΜL (ref 0–0.61)
EOSINOPHIL NFR BLD AUTO: 5 % (ref 0–6)
ERYTHROCYTE [DISTWIDTH] IN BLOOD BY AUTOMATED COUNT: 13.8 % (ref 11.6–15.1)
GFR SERPL CREATININE-BSD FRML MDRD: 68 ML/MIN/1.73SQ M
GLUCOSE P FAST SERPL-MCNC: 70 MG/DL (ref 65–99)
HCT VFR BLD AUTO: 38.8 % (ref 34.8–46.1)
HGB BLD-MCNC: 11.9 G/DL (ref 11.5–15.4)
IMM GRANULOCYTES # BLD AUTO: 0.01 THOUSAND/UL (ref 0–0.2)
IMM GRANULOCYTES NFR BLD AUTO: 0 % (ref 0–2)
LYMPHOCYTES # BLD AUTO: 1.48 THOUSANDS/ΜL (ref 0.6–4.47)
LYMPHOCYTES NFR BLD AUTO: 25 % (ref 14–44)
MCH RBC QN AUTO: 32.3 PG (ref 26.8–34.3)
MCHC RBC AUTO-ENTMCNC: 30.7 G/DL (ref 31.4–37.4)
MCV RBC AUTO: 105 FL (ref 82–98)
MONOCYTES # BLD AUTO: 0.84 THOUSAND/ΜL (ref 0.17–1.22)
MONOCYTES NFR BLD AUTO: 14 % (ref 4–12)
NEUTROPHILS # BLD AUTO: 3.29 THOUSANDS/ΜL (ref 1.85–7.62)
NEUTS SEG NFR BLD AUTO: 55 % (ref 43–75)
NRBC BLD AUTO-RTO: 0 /100 WBCS
PLATELET # BLD AUTO: 278 THOUSANDS/UL (ref 149–390)
PMV BLD AUTO: 10.7 FL (ref 8.9–12.7)
POTASSIUM SERPL-SCNC: 3.9 MMOL/L (ref 3.5–5.3)
PROT SERPL-MCNC: 7 G/DL (ref 6.4–8.2)
RBC # BLD AUTO: 3.68 MILLION/UL (ref 3.81–5.12)
SODIUM SERPL-SCNC: 137 MMOL/L (ref 136–145)
WBC # BLD AUTO: 5.97 THOUSAND/UL (ref 4.31–10.16)

## 2018-08-08 PROCEDURE — 36415 COLL VENOUS BLD VENIPUNCTURE: CPT

## 2018-08-08 PROCEDURE — 85025 COMPLETE CBC W/AUTO DIFF WBC: CPT

## 2018-08-08 PROCEDURE — 80053 COMPREHEN METABOLIC PANEL: CPT

## 2018-08-26 DIAGNOSIS — G62.9 POLYNEUROPATHY: Primary | ICD-10-CM

## 2018-08-26 DIAGNOSIS — E55.9 VITAMIN D DEFICIENCY: ICD-10-CM

## 2018-08-27 RX ORDER — ERGOCALCIFEROL 1.25 MG/1
CAPSULE ORAL
Qty: 8 CAPSULE | Refills: 0 | OUTPATIENT
Start: 2018-08-27

## 2018-08-27 RX ORDER — VENLAFAXINE HYDROCHLORIDE 150 MG/1
CAPSULE, EXTENDED RELEASE ORAL
Qty: 30 CAPSULE | Refills: 4 | Status: SHIPPED | OUTPATIENT
Start: 2018-08-27 | End: 2019-01-01 | Stop reason: SDUPTHER

## 2018-08-27 RX ORDER — GABAPENTIN 300 MG/1
CAPSULE ORAL
Qty: 150 CAPSULE | Refills: 4 | Status: SHIPPED | OUTPATIENT
Start: 2018-08-27 | End: 2019-01-01 | Stop reason: SDUPTHER

## 2018-08-31 NOTE — TELEPHONE ENCOUNTER
pt called back and states that she has been taking vit d 50,000 units weekly for a year or two  states that she is bedridden and doesn't go out side  she is not taking any thing otc  she is agreeable to having level checked  please enter and we can mail to pt

## 2018-10-18 ENCOUNTER — APPOINTMENT (OUTPATIENT)
Dept: LAB | Facility: CLINIC | Age: 78
End: 2018-10-18
Payer: MEDICARE

## 2018-10-18 DIAGNOSIS — E55.9 VITAMIN D DEFICIENCY: ICD-10-CM

## 2018-10-18 LAB — 25(OH)D3 SERPL-MCNC: 54 NG/ML (ref 30–100)

## 2018-10-18 PROCEDURE — 36415 COLL VENOUS BLD VENIPUNCTURE: CPT

## 2018-10-18 PROCEDURE — 82306 VITAMIN D 25 HYDROXY: CPT

## 2018-10-23 ENCOUNTER — APPOINTMENT (EMERGENCY)
Dept: ULTRASOUND IMAGING | Facility: HOSPITAL | Age: 78
End: 2018-10-23
Payer: MEDICARE

## 2018-10-23 ENCOUNTER — OFFICE VISIT (OUTPATIENT)
Dept: NEUROLOGY | Facility: CLINIC | Age: 78
End: 2018-10-23
Payer: MEDICARE

## 2018-10-23 ENCOUNTER — HOSPITAL ENCOUNTER (EMERGENCY)
Facility: HOSPITAL | Age: 78
Discharge: LEFT BEFORE DISCHARGE | End: 2018-10-23
Attending: EMERGENCY MEDICINE
Payer: MEDICARE

## 2018-10-23 VITALS — RESPIRATION RATE: 16 BRPM | SYSTOLIC BLOOD PRESSURE: 110 MMHG | HEART RATE: 75 BPM | DIASTOLIC BLOOD PRESSURE: 58 MMHG

## 2018-10-23 VITALS
SYSTOLIC BLOOD PRESSURE: 111 MMHG | HEART RATE: 81 BPM | DIASTOLIC BLOOD PRESSURE: 67 MMHG | OXYGEN SATURATION: 96 % | RESPIRATION RATE: 18 BRPM | TEMPERATURE: 98.1 F

## 2018-10-23 DIAGNOSIS — G36.0 DEVIC'S DISEASE (HCC): Primary | ICD-10-CM

## 2018-10-23 DIAGNOSIS — R60.0 ARM EDEMA: Primary | ICD-10-CM

## 2018-10-23 PROCEDURE — 99283 EMERGENCY DEPT VISIT LOW MDM: CPT

## 2018-10-23 PROCEDURE — 99214 OFFICE O/P EST MOD 30 MIN: CPT | Performed by: PSYCHIATRY & NEUROLOGY

## 2018-10-23 NOTE — ED NOTES
Pt wants to leave AMA due to "having to get out of her wheelchair," explained to pt that we can put her in one of our stretchers, pt refused due to having to be put in "my own bed"  Nurse aware and had pt sign an AMA form       Jesse Garza  10/23/18 0026
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
## 2018-10-23 NOTE — ASSESSMENT & PLAN NOTE
Pt here for neuro follow up  Last seen in aug 20 2018 by greg min  Overall doing well since last visit  Pt cont to use upper ext and pracitice fine dexterity  Pt currently with some increased swelling of the right upper ext with sensivity to touch and some warmth goyo around area of the lower forearm with patch of erythema  No fever   Pt notes always feeling cold  Pt cont on cellcept  Pt had last labs in aug  Cbc diff ok and cmp ok  Will repeat in nov  Pt recommended to go to er for eval of right arm swelling , question cellulitis vs any risk for dvt  Pt has missed followup with dr Dona Diehl, this needs to be rescheduled  Pt also missed follow up with mirella  Pt had cataract surgery on the right about 8 months ago  Needs follow up for her nmo as well

## 2018-10-23 NOTE — PATIENT INSTRUCTIONS
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 )  Pt here for neuro follow up  Last seen in aug 20 2018 by greg min  Overall doing well since last visit  Pt cont to use upper ext and pracitice fine dexterity  Pt currently with some increased swelling of the right upper ext with sensivity to touch and some warmth goyo around area of the lower forearm with patch of erythema  No fever   Pt notes always feeling cold  Pt cont on cellcept  Pt had last labs in aug  Cbc diff ok and cmp ok  Will repeat in nov  Pt recommended to go to er for eval of right arm swelling , question cellulitis vs any risk for dvt  Pt has missed followup with dr Natalio Teresa, this needs to be rescheduled  Pt also missed follow up with optho  Pt had cataract surgery on the right about 8 months ago  Needs follow up for her nmo as well

## 2018-10-23 NOTE — PROGRESS NOTES
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[facility] )  Pt here for neuro follow up  Last seen in aug 20 2018 by greg min  Overall doing well since last visit  Pt cont to use upper ext and pracitice fine dexterity  Pt currently with some increased swelling of the right upper ext with sensivity to touch and some warmth goyo around area of the lower forearm with patch of erythema  No fever   Pt notes always feeling cold  Pt cont on cellcept  Pt had last labs in aug  Cbc diff ok and cmp ok  Will repeat in nov  Pt recommended to go to er for eval of right arm swelling , question cellulitis vs any risk for dvt  Pt has missed followup with dr Lucila Barajas, this needs to be rescheduled  Pt also missed follow up with optho  Pt had cataract surgery on the right about 8 months ago  Needs follow up for her nmo as well         Diagnoses and all orders for this visit:    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[place] [address] )  -     CBC and differential; Future  -     Comprehensive metabolic panel; Future           Subjective:    HPI    69 y/o female presents for NMO follow up, last seen in June 20 2018 by greg min  Pt overall doing ok since last visit  No neuro hospitalizations since last visit  Pt daughter is helping her at home and pt has a few hours of help 2 days per week from an aide  Pt cont to use upper ext and pracitice fine dexterity with home exercises and rom  Pt currently with some increased swelling of the right upper ext with sensivity to touch and some warmth goyo around area of the lower forearm with patch of erythema  No fever   Pt notes always feeling cold  rec pt to go to er for further eval of the right upper ext due to her complicated pmh for cellulitis as well as any dvt eval   Pt cont on cellcept  No new sxs  Pt had last labs in aug  Cbc diff ok and cmp ok  Will repeat in nov  Pt has missed followup with dr Lucila Barajas, this needs to be rescheduled  Pt also missed follow up with optrosalia    Pt had cataract surgery on the right about 8 months ago  Needs follow up for her nmo as well for ocular surviellance  No change in vision  No loss of vision  No pain with eye movement  No diplopia  No sz  Pt remains on cellcept at 500 gm daily  Pt also follows up with her pcp as well  She also remains on gabapentin  Pt denies any changes in her speech or swallowing  Total time spent today 25  minutes  Greater than 50% of total time was spent with the patient and / or family counseling and / or coordination of care       The following portions of the patient's history were reviewed and updated as appropriate: allergies, current medications, past family history, past medical history, past social history, past surgical history and problem list          Objective:    Blood pressure 110/58, pulse 75, resp  rate 16  Physical Exam   Constitutional: She appears well-developed and well-nourished  Eyes: Pupils are equal, round, and reactive to light  Lids are normal    Cardiovascular: Intact distal pulses  Musculoskeletal:   Some tenderness to touch of the right upper ext  Also some cellulitis of the right forearm  Some erythema of the right upper ext   Neurological:   Reflex Scores:       Tricep reflexes are 2+ on the right side and 2+ on the left side  Bicep reflexes are 2+ on the right side and 2+ on the left side  Brachioradialis reflexes are 2+ on the right side and 2+ on the left side  Patellar reflexes are 1+ on the right side and 1+ on the left side  Achilles reflexes are 1+ on the right side and 1+ on the left side  Psychiatric: Her speech is normal        Neurological Exam  Mental Status  Awake, alert and oriented to person, place and time  Recent and remote memory are intact  Speech is normal  Language is fluent with no aphasia  Attention and concentration are normal  Fund of knowledge is appropriate for level of education      Cranial Nerves  CN II: Visual acuity is normal  Visual fields full to confrontation  CN III, IV, VI: Extraocular movements intact bilaterally  Normal lids and orbits bilaterally  Pupils equal round and reactive to light bilaterally  CN V: Facial sensation is normal   CN VII: Full and symmetric facial movement  CN VIII: Hearing is normal   CN IX, X: Palate elevates symmetrically  Normal gag reflex  CN XI: Shoulder shrug strength is normal   CN XII: Tongue midline without atrophy or fasciculations  Motor    Wilner upper ext 4/5  wilner lower ext prox 3/5, distal 4-/5, intrinsic foot 2/5 wilner  Sensory  Dec vib wilner lowers  Reflexes                                           Right                      Left  Brachioradialis                    2+                         2+  Biceps                                 2+                         2+  Triceps                                2+                         2+  Finger flex                           2+                         2+  Hamstring                            2+                         2+  Patellar                                1+                         1+  Achilles                                1+                         1+  Plantar                           Downgoing                Downgoing    Coordination  Right: Finger-to-nose normal   Left: Finger-to-nose normal     Gait  Non ambulatory in power chair  ROS:    Review of Systems   Constitutional: Positive for chills and fatigue  Negative for appetite change and fever  HENT: Negative  Negative for hearing loss, tinnitus, trouble swallowing and voice change  Eyes: Negative  Negative for photophobia and pain (dry eyes)  Respiratory: Positive for cough  Negative for shortness of breath  Cardiovascular: Negative  Negative for palpitations  Gastrointestinal: Negative  Negative for nausea and vomiting  Loss of bladder control     Endocrine: Negative  Negative for cold intolerance and heat intolerance     Genitourinary: Negative  Negative for dysuria, frequency and urgency  Loss of bowel control     Musculoskeletal: Positive for arthralgias, gait problem and myalgias  Negative for neck pain  Skin: Negative  Negative for rash  Neurological: Positive for numbness  Negative for dizziness, tremors, seizures, syncope, facial asymmetry, speech difficulty, weakness, light-headedness and headaches  Hematological: Negative  Does not bruise/bleed easily  Psychiatric/Behavioral: Negative for confusion, hallucinations and sleep disturbance  The patient is nervous/anxious (depression)

## 2018-10-26 ENCOUNTER — HOSPITAL ENCOUNTER (EMERGENCY)
Facility: HOSPITAL | Age: 78
Discharge: HOME | End: 2018-10-26
Attending: EMERGENCY MEDICINE | Admitting: EMERGENCY MEDICINE
Payer: MEDICARE

## 2018-10-26 ENCOUNTER — APPOINTMENT (EMERGENCY)
Dept: RADIOLOGY | Facility: HOSPITAL | Age: 78
End: 2018-10-26
Payer: MEDICARE

## 2018-10-26 VITALS
HEART RATE: 77 BPM | BODY MASS INDEX: 30.41 KG/M2 | WEIGHT: 200 LBS | TEMPERATURE: 97.3 F | SYSTOLIC BLOOD PRESSURE: 110 MMHG | OXYGEN SATURATION: 99 % | RESPIRATION RATE: 18 BRPM | DIASTOLIC BLOOD PRESSURE: 57 MMHG

## 2018-10-26 DIAGNOSIS — N39.0 UTI (URINARY TRACT INFECTION): Primary | ICD-10-CM

## 2018-10-26 DIAGNOSIS — M79.89 ARM SWELLING: ICD-10-CM

## 2018-10-26 LAB
ANION GAP SERPL CALCULATED.3IONS-SCNC: 6 MMOL/L (ref 4–13)
APTT PPP: 35 SECONDS (ref 24–36)
BACTERIA UR QL AUTO: ABNORMAL /HPF
BASOPHILS # BLD AUTO: 0.03 THOUSANDS/ΜL (ref 0–0.1)
BASOPHILS NFR BLD AUTO: 1 % (ref 0–1)
BILIRUB UR QL STRIP: NEGATIVE
BUN SERPL-MCNC: 19 MG/DL (ref 5–25)
CALCIUM SERPL-MCNC: 9.1 MG/DL (ref 8.3–10.1)
CHLORIDE SERPL-SCNC: 100 MMOL/L (ref 100–108)
CLARITY UR: ABNORMAL
CO2 SERPL-SCNC: 30 MMOL/L (ref 21–32)
COLOR UR: YELLOW
CREAT SERPL-MCNC: 0.85 MG/DL (ref 0.6–1.3)
EOSINOPHIL # BLD AUTO: 0.29 THOUSAND/ΜL (ref 0–0.61)
EOSINOPHIL NFR BLD AUTO: 5 % (ref 0–6)
ERYTHROCYTE [DISTWIDTH] IN BLOOD BY AUTOMATED COUNT: 13.2 % (ref 11.6–15.1)
GFR SERPL CREATININE-BSD FRML MDRD: 66 ML/MIN/1.73SQ M
GLUCOSE SERPL-MCNC: 96 MG/DL (ref 65–140)
GLUCOSE UR STRIP-MCNC: NEGATIVE MG/DL
HCT VFR BLD AUTO: 42.4 % (ref 34.8–46.1)
HGB BLD-MCNC: 13.1 G/DL (ref 11.5–15.4)
HGB UR QL STRIP.AUTO: ABNORMAL
IMM GRANULOCYTES # BLD AUTO: 0.02 THOUSAND/UL (ref 0–0.2)
IMM GRANULOCYTES NFR BLD AUTO: 0 % (ref 0–2)
INR PPP: 1.43 (ref 0.86–1.16)
KETONES UR STRIP-MCNC: NEGATIVE MG/DL
LEUKOCYTE ESTERASE UR QL STRIP: ABNORMAL
LYMPHOCYTES # BLD AUTO: 1.31 THOUSANDS/ΜL (ref 0.6–4.47)
LYMPHOCYTES NFR BLD AUTO: 24 % (ref 14–44)
MCH RBC QN AUTO: 32.4 PG (ref 26.8–34.3)
MCHC RBC AUTO-ENTMCNC: 30.9 G/DL (ref 31.4–37.4)
MCV RBC AUTO: 105 FL (ref 82–98)
MONOCYTES # BLD AUTO: 0.66 THOUSAND/ΜL (ref 0.17–1.22)
MONOCYTES NFR BLD AUTO: 12 % (ref 4–12)
NEUTROPHILS # BLD AUTO: 3.27 THOUSANDS/ΜL (ref 1.85–7.62)
NEUTS SEG NFR BLD AUTO: 58 % (ref 43–75)
NITRITE UR QL STRIP: POSITIVE
NON-SQ EPI CELLS URNS QL MICRO: ABNORMAL /HPF
NRBC BLD AUTO-RTO: 0 /100 WBCS
PH UR STRIP.AUTO: 7.5 [PH] (ref 5–9)
PLATELET # BLD AUTO: 274 THOUSANDS/UL (ref 149–390)
PMV BLD AUTO: 9.8 FL (ref 8.9–12.7)
POTASSIUM SERPL-SCNC: 4.1 MMOL/L (ref 3.5–5.3)
PROT UR STRIP-MCNC: ABNORMAL MG/DL
PROTHROMBIN TIME: 14.7 SECONDS (ref 9.4–11.7)
RBC # BLD AUTO: 4.04 MILLION/UL (ref 3.81–5.12)
RBC #/AREA URNS AUTO: ABNORMAL /HPF
SODIUM SERPL-SCNC: 136 MMOL/L (ref 136–145)
SP GR UR STRIP.AUTO: <=1.005 (ref 1–1.03)
UROBILINOGEN UR QL STRIP.AUTO: 0.2 E.U./DL
WBC # BLD AUTO: 5.58 THOUSAND/UL (ref 4.31–10.16)
WBC #/AREA URNS AUTO: ABNORMAL /HPF

## 2018-10-26 PROCEDURE — 87086 URINE CULTURE/COLONY COUNT: CPT | Performed by: PHYSICIAN ASSISTANT

## 2018-10-26 PROCEDURE — 93971 EXTREMITY STUDY: CPT

## 2018-10-26 PROCEDURE — 36415 COLL VENOUS BLD VENIPUNCTURE: CPT | Performed by: PHYSICIAN ASSISTANT

## 2018-10-26 PROCEDURE — 99284 EMERGENCY DEPT VISIT MOD MDM: CPT

## 2018-10-26 PROCEDURE — 93971 EXTREMITY STUDY: CPT | Performed by: SURGERY

## 2018-10-26 PROCEDURE — 87077 CULTURE AEROBIC IDENTIFY: CPT | Performed by: PHYSICIAN ASSISTANT

## 2018-10-26 PROCEDURE — 85610 PROTHROMBIN TIME: CPT | Performed by: PHYSICIAN ASSISTANT

## 2018-10-26 PROCEDURE — 80048 BASIC METABOLIC PNL TOTAL CA: CPT | Performed by: PHYSICIAN ASSISTANT

## 2018-10-26 PROCEDURE — 81001 URINALYSIS AUTO W/SCOPE: CPT | Performed by: PHYSICIAN ASSISTANT

## 2018-10-26 PROCEDURE — 85025 COMPLETE CBC W/AUTO DIFF WBC: CPT | Performed by: PHYSICIAN ASSISTANT

## 2018-10-26 PROCEDURE — 85730 THROMBOPLASTIN TIME PARTIAL: CPT | Performed by: PHYSICIAN ASSISTANT

## 2018-10-26 PROCEDURE — 87186 SC STD MICRODIL/AGAR DIL: CPT | Performed by: PHYSICIAN ASSISTANT

## 2018-10-26 RX ORDER — NITROFURANTOIN 25; 75 MG/1; MG/1
100 CAPSULE ORAL 2 TIMES DAILY
Qty: 14 CAPSULE | Refills: 0 | Status: SHIPPED | OUTPATIENT
Start: 2018-10-26 | End: 2018-11-02

## 2018-10-26 NOTE — ED NOTES
Vascular study will be done in about an hour  Patient and physician assistant aware       Joel Christie RN  10/26/18 9977

## 2018-10-26 NOTE — DISCHARGE INSTRUCTIONS
Catheter-associated Urinary Tract Infection   WHAT YOU NEED TO KNOW:   What is a catheter-associated urinary tract infection (CAUTI)? A CAUTI is an infection caused by an indwelling urinary catheter  An indwelling urinary catheter is a thin, flexible tube that is inserted into the bladder  It is left in place to drain urine  The infection may travel along the catheter and into the bladder or kidneys  What causes a CAUTI? A CAUTI is caused by germs that do not usually live in the urinary tract  The germ can be a fungus or bacteria  Germs may get into the urinary tract when the catheter is being put in or while the catheter stays in the bladder  Being female and having a catheter for more than 48 hours may increase your risk for a CAUTI  Medical conditions, such as diabetes can also increase your risk  What are the signs and symptoms of a CAUTI? A CAUTI may not cause symptoms, or you may have any of the following:  · Fever and chills    · Pain in your lower abdomen or back    · Pus from the area where the catheter is inserted, or pus in your urine     · Bad-smelling, cloudy, bloody, or dark urine     · Burning during urination or frequent urination after the catheter is removed     · Sudden, strong need to urinate    · Confusion in older adults  How is a CAUTI diagnosed? · A urinalysis  will give information about your urinary tract and overall health  · A urine culture  may show the type of germ causing the infection  · Blood tests  will show infection and kidney function  How is a CAUTI treated? · Medicines  may be given to treat an infection or decrease pain and fever  · Catheter removal or change  may help get rid of the infection  How can I manage my symptoms? · Drink fluids as directed  Fluids may help your kidneys and bladder get rid of the infection  · Keep the catheter area clean  Clean your skin around the catheter as directed  Shower once a day   Do not take baths or go in hot tubs until your infection is gone  · Do not have sex  until your healthcare provider says it is okay  Sex may delay healing or cause another UTI  How can I help prevent a CAUTI? · Wash your hands before and after you use the bathroom or touch the catheter  Wash your hands to prevent the spread of infection to your urinary tract  · Clean all parts of your catheter as directed  Keep your catheter tubing clean  Do not place the catheter on the ground  Do not allow the drainage spout to touch the toilet  Use an alcohol swab to clean the end of drainage spout as directed  · Keep the drainage bag below your waist   This will prevent urine from moving back into your bladder, which can cause an infection  · Empty the urine bag as directed  This may prevent urine from moving back into your bladder  · Women should wipe front to back  after a bowel movement  This may prevent germs from getting into the urinary tract  · Keep the catheter secured to your leg as directed  Use tape or a special catheter love to prevent your catheter from being pulled  This may also prevent kinks that could cause the urine to move back into the bladder  When should I seek immediate care? · You have severe pain in your lower back or abdomen  · You have blood in your urine  · You stop urinating, or you urinate much less than usual   When should I contact my healthcare provider? · You have a fever  · Your symptoms do not improve or get worse  · You have questions or concerns about your condition or care  CARE AGREEMENT:   You have the right to help plan your care  Learn about your health condition and how it may be treated  Discuss treatment options with your caregivers to decide what care you want to receive  You always have the right to refuse treatment  The above information is an  only  It is not intended as medical advice for individual conditions or treatments   Talk to your doctor, nurse or pharmacist before following any medical regimen to see if it is safe and effective for you  © 2017 [employer] Information is for End User's use only and may not be sold, redistributed or otherwise used for commercial purposes  All illustrations and images included in CareNotes® are the copyrighted property of [employer]  or [employer]
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
## 2018-10-26 NOTE — ED NOTES
Desiree from Vascular is in with a Stat on the floor, will do study between 4314-1221     Autumn Inch PA is aware     Rubin Brooks  10/26/18 7842

## 2018-10-29 LAB
BACTERIA UR CULT: ABNORMAL
BACTERIA UR CULT: ABNORMAL

## 2018-11-28 ENCOUNTER — HOSPITAL ENCOUNTER (OUTPATIENT)
Dept: INFUSION CENTER | Facility: CLINIC | Age: 78
Discharge: HOME | End: 2018-11-28
Payer: MEDICARE

## 2018-11-28 PROCEDURE — 96523 IRRIG DRUG DELIVERY DEVICE: CPT

## 2018-11-28 RX ADMIN — HEPARIN 300 UNITS: 100 SYRINGE at 14:42

## 2018-12-13 DIAGNOSIS — G62.9 POLYNEUROPATHY: ICD-10-CM

## 2018-12-13 DIAGNOSIS — G36.0 NEUROMYELITIS OPTICA (DEVIC) (HCC): ICD-10-CM

## 2018-12-13 RX ORDER — MYCOPHENOLATE MOFETIL 500 MG/1
500 TABLET ORAL DAILY
Qty: 30 TABLET | Refills: 2 | Status: SHIPPED | OUTPATIENT
Start: 2018-12-13 | End: 2019-01-01 | Stop reason: SDUPTHER

## 2018-12-13 RX ORDER — AMITRIPTYLINE HYDROCHLORIDE 25 MG/1
25 TABLET, FILM COATED ORAL
Qty: 30 TABLET | Refills: 2 | Status: SHIPPED | OUTPATIENT
Start: 2018-12-13 | End: 2019-01-01 | Stop reason: SDUPTHER

## 2019-01-01 ENCOUNTER — OFFICE VISIT (OUTPATIENT)
Dept: PODIATRY | Facility: CLINIC | Age: 79
End: 2019-01-01
Payer: MEDICARE

## 2019-01-01 ENCOUNTER — TELEPHONE (OUTPATIENT)
Dept: NEUROLOGY | Facility: CLINIC | Age: 79
End: 2019-01-01

## 2019-01-01 ENCOUNTER — OFFICE VISIT (OUTPATIENT)
Dept: NEUROLOGY | Facility: CLINIC | Age: 79
End: 2019-01-01
Payer: MEDICARE

## 2019-01-01 ENCOUNTER — TELEPHONE (OUTPATIENT)
Dept: PAIN MEDICINE | Facility: CLINIC | Age: 79
End: 2019-01-01

## 2019-01-01 ENCOUNTER — APPOINTMENT (OUTPATIENT)
Dept: LAB | Facility: CLINIC | Age: 79
End: 2019-01-01
Payer: MEDICARE

## 2019-01-01 ENCOUNTER — TELEPHONE (OUTPATIENT)
Dept: PAIN MEDICINE | Facility: MEDICAL CENTER | Age: 79
End: 2019-01-01

## 2019-01-01 ENCOUNTER — OFFICE VISIT (OUTPATIENT)
Dept: PAIN MEDICINE | Facility: CLINIC | Age: 79
End: 2019-01-01
Payer: MEDICARE

## 2019-01-01 ENCOUNTER — APPOINTMENT (EMERGENCY)
Dept: RADIOLOGY | Facility: HOSPITAL | Age: 79
DRG: 689 | End: 2019-01-01
Payer: MEDICARE

## 2019-01-01 ENCOUNTER — APPOINTMENT (INPATIENT)
Dept: RADIOLOGY | Facility: HOSPITAL | Age: 79
DRG: 689 | End: 2019-01-01
Payer: MEDICARE

## 2019-01-01 ENCOUNTER — HOSPITAL ENCOUNTER (OUTPATIENT)
Dept: INFUSION CENTER | Facility: CLINIC | Age: 79
Discharge: HOME | End: 2019-01-23

## 2019-01-01 ENCOUNTER — EPISODE CHANGES (OUTPATIENT)
Dept: CASE MANAGEMENT | Facility: OTHER | Age: 79
End: 2019-01-01

## 2019-01-01 ENCOUNTER — HOSPITAL ENCOUNTER (INPATIENT)
Facility: HOSPITAL | Age: 79
LOS: 3 days | Discharge: NURSING, REHAB OR LONG-TERM CARE | DRG: 689 | End: 2019-12-27
Attending: EMERGENCY MEDICINE | Admitting: INTERNAL MEDICINE
Payer: MEDICARE

## 2019-01-01 ENCOUNTER — ANESTHESIA (INPATIENT)
Dept: PERIOP | Facility: HOSPITAL | Age: 79
DRG: 689 | End: 2019-01-01
Payer: MEDICARE

## 2019-01-01 ENCOUNTER — PATIENT OUTREACH (OUTPATIENT)
Dept: CASE MANAGEMENT | Facility: OTHER | Age: 79
End: 2019-01-01

## 2019-01-01 ENCOUNTER — TELEPHONE (OUTPATIENT)
Dept: HEMATOLOGY ONCOLOGY | Facility: CLINIC | Age: 79
End: 2019-01-01

## 2019-01-01 ENCOUNTER — TRANSCRIBE ORDERS (OUTPATIENT)
Dept: LAB | Facility: CLINIC | Age: 79
End: 2019-01-01

## 2019-01-01 ENCOUNTER — ANESTHESIA EVENT (INPATIENT)
Dept: PERIOP | Facility: HOSPITAL | Age: 79
DRG: 689 | End: 2019-01-01
Payer: MEDICARE

## 2019-01-01 VITALS
WEIGHT: 181.88 LBS | OXYGEN SATURATION: 95 % | RESPIRATION RATE: 18 BRPM | SYSTOLIC BLOOD PRESSURE: 109 MMHG | BODY MASS INDEX: 28.55 KG/M2 | TEMPERATURE: 99.1 F | HEIGHT: 67 IN | HEART RATE: 85 BPM | DIASTOLIC BLOOD PRESSURE: 57 MMHG

## 2019-01-01 VITALS — SYSTOLIC BLOOD PRESSURE: 105 MMHG | HEART RATE: 84 BPM | RESPIRATION RATE: 12 BRPM | DIASTOLIC BLOOD PRESSURE: 61 MMHG

## 2019-01-01 VITALS
HEART RATE: 84 BPM | WEIGHT: 200 LBS | BODY MASS INDEX: 30.41 KG/M2 | DIASTOLIC BLOOD PRESSURE: 61 MMHG | SYSTOLIC BLOOD PRESSURE: 105 MMHG

## 2019-01-01 VITALS — RESPIRATION RATE: 17 BRPM | BODY MASS INDEX: 30.41 KG/M2 | WEIGHT: 200 LBS

## 2019-01-01 VITALS — SYSTOLIC BLOOD PRESSURE: 142 MMHG | DIASTOLIC BLOOD PRESSURE: 85 MMHG | HEART RATE: 65 BPM

## 2019-01-01 VITALS — SYSTOLIC BLOOD PRESSURE: 130 MMHG | DIASTOLIC BLOOD PRESSURE: 84 MMHG | HEART RATE: 94 BPM

## 2019-01-01 DIAGNOSIS — N39.0 UTI (URINARY TRACT INFECTION): Primary | ICD-10-CM

## 2019-01-01 DIAGNOSIS — G62.9 POLYNEUROPATHY: ICD-10-CM

## 2019-01-01 DIAGNOSIS — B35.1 ONYCHOMYCOSIS: ICD-10-CM

## 2019-01-01 DIAGNOSIS — M79.2 NEUROPATHIC PAIN: ICD-10-CM

## 2019-01-01 DIAGNOSIS — M79.671 PAIN IN BOTH FEET: ICD-10-CM

## 2019-01-01 DIAGNOSIS — N31.9: ICD-10-CM

## 2019-01-01 DIAGNOSIS — I70.209 PERIPHERAL ARTERIOSCLEROSIS (HCC): ICD-10-CM

## 2019-01-01 DIAGNOSIS — G36.0 DEVIC'S DISEASE (HCC): Primary | ICD-10-CM

## 2019-01-01 DIAGNOSIS — M79.672 PAIN IN BOTH FEET: Primary | ICD-10-CM

## 2019-01-01 DIAGNOSIS — M79.672 PAIN IN BOTH FEET: ICD-10-CM

## 2019-01-01 DIAGNOSIS — R05.9 COUGH: ICD-10-CM

## 2019-01-01 DIAGNOSIS — K59.00 CONSTIPATION: ICD-10-CM

## 2019-01-01 DIAGNOSIS — B35.9 DERMATOPHYTOSIS: ICD-10-CM

## 2019-01-01 DIAGNOSIS — R60.9 CHRONIC EDEMA: ICD-10-CM

## 2019-01-01 DIAGNOSIS — G89.4 CHRONIC PAIN SYNDROME: Primary | ICD-10-CM

## 2019-01-01 DIAGNOSIS — G36.0 NEUROMYELITIS OPTICA (DEVIC) (HCC): ICD-10-CM

## 2019-01-01 DIAGNOSIS — R63.8 DECREASED ORAL INTAKE: ICD-10-CM

## 2019-01-01 DIAGNOSIS — I70.209 PERIPHERAL ARTERIOSCLEROSIS (HCC): Primary | ICD-10-CM

## 2019-01-01 DIAGNOSIS — Z86.718 HISTORY OF DVT (DEEP VEIN THROMBOSIS): ICD-10-CM

## 2019-01-01 DIAGNOSIS — L89.159 SACRAL DECUBITUS ULCER: ICD-10-CM

## 2019-01-01 DIAGNOSIS — N31.9: Chronic | ICD-10-CM

## 2019-01-01 DIAGNOSIS — R82.90 ABNORMAL URINALYSIS: ICD-10-CM

## 2019-01-01 DIAGNOSIS — G36.0 DEVIC'S DISEASE (HCC): ICD-10-CM

## 2019-01-01 DIAGNOSIS — R30.0 DYSURIA: Primary | ICD-10-CM

## 2019-01-01 DIAGNOSIS — M79.671 PAIN IN BOTH FEET: Primary | ICD-10-CM

## 2019-01-01 DIAGNOSIS — L98.8 FISTULA: ICD-10-CM

## 2019-01-01 DIAGNOSIS — R30.0 DYSURIA: ICD-10-CM

## 2019-01-01 DIAGNOSIS — L02.611 ABSCESS, TOE, RIGHT: ICD-10-CM

## 2019-01-01 DIAGNOSIS — G89.4 CHRONIC PAIN SYNDROME: ICD-10-CM

## 2019-01-01 LAB
ALBUMIN SERPL BCP-MCNC: 2.5 G/DL (ref 3.5–5)
ALBUMIN SERPL BCP-MCNC: 2.6 G/DL (ref 3.5–5)
ALBUMIN SERPL BCP-MCNC: 2.9 G/DL (ref 3.5–5)
ALBUMIN SERPL BCP-MCNC: 2.9 G/DL (ref 3.5–5)
ALP SERPL-CCNC: 107 U/L (ref 46–116)
ALP SERPL-CCNC: 113 U/L (ref 46–116)
ALP SERPL-CCNC: 133 U/L (ref 46–116)
ALP SERPL-CCNC: 157 U/L (ref 46–116)
ALT SERPL W P-5'-P-CCNC: 14 U/L (ref 12–78)
ALT SERPL W P-5'-P-CCNC: 14 U/L (ref 12–78)
ALT SERPL W P-5'-P-CCNC: 20 U/L (ref 12–78)
ALT SERPL W P-5'-P-CCNC: 8 U/L (ref 12–78)
ANION GAP SERPL CALCULATED.3IONS-SCNC: 5 MMOL/L (ref 4–13)
ANION GAP SERPL CALCULATED.3IONS-SCNC: 5 MMOL/L (ref 4–13)
ANION GAP SERPL CALCULATED.3IONS-SCNC: 6 MMOL/L (ref 4–13)
ANION GAP SERPL CALCULATED.3IONS-SCNC: 6 MMOL/L (ref 4–13)
ANION GAP SERPL CALCULATED.3IONS-SCNC: 7 MMOL/L (ref 4–13)
APTT PPP: 33 SECONDS (ref 23–37)
AST SERPL W P-5'-P-CCNC: 16 U/L (ref 5–45)
AST SERPL W P-5'-P-CCNC: 19 U/L (ref 5–45)
AST SERPL W P-5'-P-CCNC: 35 U/L (ref 5–45)
AST SERPL W P-5'-P-CCNC: 37 U/L (ref 5–45)
ATRIAL RATE: 88 BPM
BACTERIA UR CULT: ABNORMAL
BACTERIA UR CULT: NORMAL
BACTERIA UR QL AUTO: ABNORMAL /HPF
BACTERIA UR QL AUTO: ABNORMAL /HPF
BASOPHILS # BLD AUTO: 0.02 THOUSANDS/ΜL (ref 0–0.1)
BASOPHILS # BLD AUTO: 0.03 THOUSANDS/ΜL (ref 0–0.1)
BASOPHILS # BLD AUTO: 0.03 THOUSANDS/ΜL (ref 0–0.1)
BASOPHILS NFR BLD AUTO: 0 % (ref 0–1)
BASOPHILS NFR BLD AUTO: 1 % (ref 0–1)
BASOPHILS NFR BLD AUTO: 1 % (ref 0–1)
BILIRUB SERPL-MCNC: 0.3 MG/DL (ref 0.2–1)
BILIRUB SERPL-MCNC: 0.3 MG/DL (ref 0.2–1)
BILIRUB SERPL-MCNC: 0.4 MG/DL (ref 0.2–1)
BILIRUB SERPL-MCNC: 0.42 MG/DL (ref 0.2–1)
BILIRUB UR QL STRIP: ABNORMAL
BILIRUB UR QL STRIP: NEGATIVE
BUN SERPL-MCNC: 11 MG/DL (ref 5–25)
BUN SERPL-MCNC: 13 MG/DL (ref 5–25)
BUN SERPL-MCNC: 14 MG/DL (ref 5–25)
BUN SERPL-MCNC: 15 MG/DL (ref 5–25)
BUN SERPL-MCNC: 16 MG/DL (ref 5–25)
CALCIUM SERPL-MCNC: 8.3 MG/DL (ref 8.3–10.1)
CALCIUM SERPL-MCNC: 8.5 MG/DL (ref 8.3–10.1)
CALCIUM SERPL-MCNC: 8.6 MG/DL (ref 8.3–10.1)
CALCIUM SERPL-MCNC: 8.9 MG/DL (ref 8.3–10.1)
CALCIUM SERPL-MCNC: 9.3 MG/DL (ref 8.3–10.1)
CAOX CRY URNS QL MICRO: ABNORMAL /HPF
CHLORIDE SERPL-SCNC: 100 MMOL/L (ref 100–108)
CHLORIDE SERPL-SCNC: 103 MMOL/L (ref 100–108)
CHLORIDE SERPL-SCNC: 105 MMOL/L (ref 100–108)
CHLORIDE SERPL-SCNC: 106 MMOL/L (ref 100–108)
CHLORIDE SERPL-SCNC: 99 MMOL/L (ref 100–108)
CLARITY UR: ABNORMAL
CLARITY UR: ABNORMAL
CO2 SERPL-SCNC: 26 MMOL/L (ref 21–32)
CO2 SERPL-SCNC: 27 MMOL/L (ref 21–32)
CO2 SERPL-SCNC: 27 MMOL/L (ref 21–32)
CO2 SERPL-SCNC: 28 MMOL/L (ref 21–32)
CO2 SERPL-SCNC: 28 MMOL/L (ref 21–32)
COLOR UR: ABNORMAL
COLOR UR: YELLOW
CREAT SERPL-MCNC: 0.6 MG/DL (ref 0.6–1.3)
CREAT SERPL-MCNC: 0.7 MG/DL (ref 0.6–1.3)
CREAT SERPL-MCNC: 0.75 MG/DL (ref 0.6–1.3)
CREAT SERPL-MCNC: 0.85 MG/DL (ref 0.6–1.3)
CREAT SERPL-MCNC: 0.93 MG/DL (ref 0.6–1.3)
EOSINOPHIL # BLD AUTO: 0.2 THOUSAND/ΜL (ref 0–0.61)
EOSINOPHIL # BLD AUTO: 0.25 THOUSAND/ΜL (ref 0–0.61)
EOSINOPHIL # BLD AUTO: 0.44 THOUSAND/ΜL (ref 0–0.61)
EOSINOPHIL NFR BLD AUTO: 2 % (ref 0–6)
EOSINOPHIL NFR BLD AUTO: 4 % (ref 0–6)
EOSINOPHIL NFR BLD AUTO: 8 % (ref 0–6)
ERYTHROCYTE [DISTWIDTH] IN BLOOD BY AUTOMATED COUNT: 13.4 % (ref 11.6–15.1)
ERYTHROCYTE [DISTWIDTH] IN BLOOD BY AUTOMATED COUNT: 13.5 % (ref 11.6–15.1)
ERYTHROCYTE [DISTWIDTH] IN BLOOD BY AUTOMATED COUNT: 13.6 % (ref 11.6–15.1)
ERYTHROCYTE [DISTWIDTH] IN BLOOD BY AUTOMATED COUNT: 13.7 % (ref 11.6–15.1)
ERYTHROCYTE [DISTWIDTH] IN BLOOD BY AUTOMATED COUNT: 13.9 % (ref 11.6–15.1)
FOLATE SERPL-MCNC: 13.2 NG/ML (ref 3.1–17.5)
GFR SERPL CREATININE-BSD FRML MDRD: 59 ML/MIN/1.73SQ M
GFR SERPL CREATININE-BSD FRML MDRD: 65 ML/MIN/1.73SQ M
GFR SERPL CREATININE-BSD FRML MDRD: 77 ML/MIN/1.73SQ M
GFR SERPL CREATININE-BSD FRML MDRD: 83 ML/MIN/1.73SQ M
GFR SERPL CREATININE-BSD FRML MDRD: 88 ML/MIN/1.73SQ M
GLUCOSE P FAST SERPL-MCNC: 60 MG/DL (ref 65–99)
GLUCOSE P FAST SERPL-MCNC: 64 MG/DL (ref 65–99)
GLUCOSE SERPL-MCNC: 106 MG/DL (ref 65–140)
GLUCOSE SERPL-MCNC: 87 MG/DL (ref 65–140)
GLUCOSE SERPL-MCNC: 89 MG/DL (ref 65–140)
GLUCOSE UR STRIP-MCNC: NEGATIVE MG/DL
GLUCOSE UR STRIP-MCNC: NEGATIVE MG/DL
HCT VFR BLD AUTO: 34.3 % (ref 34.8–46.1)
HCT VFR BLD AUTO: 34.6 % (ref 34.8–46.1)
HCT VFR BLD AUTO: 35.7 % (ref 34.8–46.1)
HCT VFR BLD AUTO: 37.7 % (ref 34.8–46.1)
HCT VFR BLD AUTO: 39.6 % (ref 34.8–46.1)
HGB BLD-MCNC: 10.7 G/DL (ref 11.5–15.4)
HGB BLD-MCNC: 11 G/DL (ref 11.5–15.4)
HGB BLD-MCNC: 11 G/DL (ref 11.5–15.4)
HGB BLD-MCNC: 11.5 G/DL (ref 11.5–15.4)
HGB BLD-MCNC: 12.6 G/DL (ref 11.5–15.4)
HGB UR QL STRIP.AUTO: ABNORMAL
HGB UR QL STRIP.AUTO: ABNORMAL
HYALINE CASTS #/AREA URNS LPF: ABNORMAL /LPF
IMM GRANULOCYTES # BLD AUTO: 0.02 THOUSAND/UL (ref 0–0.2)
IMM GRANULOCYTES # BLD AUTO: 0.02 THOUSAND/UL (ref 0–0.2)
IMM GRANULOCYTES # BLD AUTO: 0.03 THOUSAND/UL (ref 0–0.2)
IMM GRANULOCYTES NFR BLD AUTO: 0 % (ref 0–2)
INR PPP: 1.09 (ref 0.91–1.09)
INR PPP: 1.12 (ref 0.91–1.09)
INR PPP: 1.19 (ref 0.91–1.09)
KETONES UR STRIP-MCNC: NEGATIVE MG/DL
KETONES UR STRIP-MCNC: NEGATIVE MG/DL
LEUKOCYTE ESTERASE UR QL STRIP: ABNORMAL
LEUKOCYTE ESTERASE UR QL STRIP: ABNORMAL
LYMPHOCYTES # BLD AUTO: 0.91 THOUSANDS/ΜL (ref 0.6–4.47)
LYMPHOCYTES # BLD AUTO: 0.92 THOUSANDS/ΜL (ref 0.6–4.47)
LYMPHOCYTES # BLD AUTO: 1.17 THOUSANDS/ΜL (ref 0.6–4.47)
LYMPHOCYTES NFR BLD AUTO: 10 % (ref 14–44)
LYMPHOCYTES NFR BLD AUTO: 17 % (ref 14–44)
LYMPHOCYTES NFR BLD AUTO: 20 % (ref 14–44)
MAGNESIUM SERPL-MCNC: 2 MG/DL (ref 1.6–2.6)
MCH RBC QN AUTO: 31.9 PG (ref 26.8–34.3)
MCH RBC QN AUTO: 32.4 PG (ref 26.8–34.3)
MCH RBC QN AUTO: 32.6 PG (ref 26.8–34.3)
MCH RBC QN AUTO: 32.7 PG (ref 26.8–34.3)
MCH RBC QN AUTO: 32.7 PG (ref 26.8–34.3)
MCHC RBC AUTO-ENTMCNC: 30.5 G/DL (ref 31.4–37.4)
MCHC RBC AUTO-ENTMCNC: 30.8 G/DL (ref 31.4–37.4)
MCHC RBC AUTO-ENTMCNC: 31.2 G/DL (ref 31.4–37.4)
MCHC RBC AUTO-ENTMCNC: 31.8 G/DL (ref 31.4–37.4)
MCHC RBC AUTO-ENTMCNC: 31.8 G/DL (ref 31.4–37.4)
MCV RBC AUTO: 102 FL (ref 82–98)
MCV RBC AUTO: 103 FL (ref 82–98)
MCV RBC AUTO: 104 FL (ref 82–98)
MCV RBC AUTO: 105 FL (ref 82–98)
MCV RBC AUTO: 106 FL (ref 82–98)
MONOCYTES # BLD AUTO: 0.8 THOUSAND/ΜL (ref 0.17–1.22)
MONOCYTES # BLD AUTO: 0.84 THOUSAND/ΜL (ref 0.17–1.22)
MONOCYTES # BLD AUTO: 0.94 THOUSAND/ΜL (ref 0.17–1.22)
MONOCYTES NFR BLD AUTO: 10 % (ref 4–12)
MONOCYTES NFR BLD AUTO: 14 % (ref 4–12)
MONOCYTES NFR BLD AUTO: 15 % (ref 4–12)
MUCOUS THREADS UR QL AUTO: ABNORMAL
NEUTROPHILS # BLD AUTO: 3.31 THOUSANDS/ΜL (ref 1.85–7.62)
NEUTROPHILS # BLD AUTO: 3.6 THOUSANDS/ΜL (ref 1.85–7.62)
NEUTROPHILS # BLD AUTO: 7.21 THOUSANDS/ΜL (ref 1.85–7.62)
NEUTS SEG NFR BLD AUTO: 59 % (ref 43–75)
NEUTS SEG NFR BLD AUTO: 61 % (ref 43–75)
NEUTS SEG NFR BLD AUTO: 78 % (ref 43–75)
NITRITE UR QL STRIP: NEGATIVE
NITRITE UR QL STRIP: POSITIVE
NON-SQ EPI CELLS URNS QL MICRO: ABNORMAL /HPF
NON-SQ EPI CELLS URNS QL MICRO: ABNORMAL /HPF
NRBC BLD AUTO-RTO: 0 /100 WBCS
OTHER STN SPEC: ABNORMAL
P AXIS: 53 DEGREES
PH UR STRIP.AUTO: 5.5 [PH]
PH UR STRIP.AUTO: 5.5 [PH]
PLATELET # BLD AUTO: 270 THOUSANDS/UL (ref 149–390)
PLATELET # BLD AUTO: 275 THOUSANDS/UL (ref 149–390)
PLATELET # BLD AUTO: 302 THOUSANDS/UL (ref 149–390)
PLATELET # BLD AUTO: 306 THOUSANDS/UL (ref 149–390)
PLATELET # BLD AUTO: 309 THOUSANDS/UL (ref 149–390)
PMV BLD AUTO: 10.7 FL (ref 8.9–12.7)
PMV BLD AUTO: 10.8 FL (ref 8.9–12.7)
PMV BLD AUTO: 9.7 FL (ref 8.9–12.7)
PMV BLD AUTO: 9.8 FL (ref 8.9–12.7)
PMV BLD AUTO: 9.8 FL (ref 8.9–12.7)
POTASSIUM SERPL-SCNC: 3.8 MMOL/L (ref 3.5–5.3)
POTASSIUM SERPL-SCNC: 3.9 MMOL/L (ref 3.5–5.3)
POTASSIUM SERPL-SCNC: 4.1 MMOL/L (ref 3.5–5.3)
POTASSIUM SERPL-SCNC: 5.3 MMOL/L (ref 3.5–5.3)
POTASSIUM SERPL-SCNC: 5.6 MMOL/L (ref 3.5–5.3)
PR INTERVAL: 184 MS
PROT SERPL-MCNC: 6.5 G/DL (ref 6.4–8.2)
PROT SERPL-MCNC: 6.9 G/DL (ref 6.4–8.2)
PROT SERPL-MCNC: 7 G/DL (ref 6.4–8.2)
PROT SERPL-MCNC: 7.7 G/DL (ref 6.4–8.2)
PROT UR STRIP-MCNC: ABNORMAL MG/DL
PROT UR STRIP-MCNC: ABNORMAL MG/DL
PROTHROMBIN TIME: 11.8 SECONDS (ref 9.8–12)
PROTHROMBIN TIME: 12 SECONDS (ref 9.8–12)
PROTHROMBIN TIME: 12.7 SECONDS (ref 9.8–12)
QRS AXIS: -14 DEGREES
QRSD INTERVAL: 82 MS
QT INTERVAL: 360 MS
QTC INTERVAL: 435 MS
RBC # BLD AUTO: 3.27 MILLION/UL (ref 3.81–5.12)
RBC # BLD AUTO: 3.36 MILLION/UL (ref 3.81–5.12)
RBC # BLD AUTO: 3.45 MILLION/UL (ref 3.81–5.12)
RBC # BLD AUTO: 3.55 MILLION/UL (ref 3.81–5.12)
RBC # BLD AUTO: 3.87 MILLION/UL (ref 3.81–5.12)
RBC #/AREA URNS AUTO: ABNORMAL /HPF
RBC #/AREA URNS AUTO: ABNORMAL /HPF
SODIUM SERPL-SCNC: 130 MMOL/L (ref 136–145)
SODIUM SERPL-SCNC: 133 MMOL/L (ref 136–145)
SODIUM SERPL-SCNC: 137 MMOL/L (ref 136–145)
SODIUM SERPL-SCNC: 139 MMOL/L (ref 136–145)
SODIUM SERPL-SCNC: 139 MMOL/L (ref 136–145)
SP GR UR STRIP.AUTO: 1.02 (ref 1–1.03)
SP GR UR STRIP.AUTO: >=1.03 (ref 1–1.03)
T WAVE AXIS: 55 DEGREES
UROBILINOGEN UR QL STRIP.AUTO: 0.2 E.U./DL
UROBILINOGEN UR QL STRIP.AUTO: 0.2 E.U./DL
VENTRICULAR RATE: 88 BPM
VIT B12 SERPL-MCNC: 290 PG/ML (ref 100–900)
WBC # BLD AUTO: 5.47 THOUSAND/UL (ref 4.31–10.16)
WBC # BLD AUTO: 5.51 THOUSAND/UL (ref 4.31–10.16)
WBC # BLD AUTO: 5.61 THOUSAND/UL (ref 4.31–10.16)
WBC # BLD AUTO: 5.91 THOUSAND/UL (ref 4.31–10.16)
WBC # BLD AUTO: 9.32 THOUSAND/UL (ref 4.31–10.16)
WBC #/AREA URNS AUTO: ABNORMAL /HPF
WBC #/AREA URNS AUTO: ABNORMAL /HPF

## 2019-01-01 PROCEDURE — 36415 COLL VENOUS BLD VENIPUNCTURE: CPT

## 2019-01-01 PROCEDURE — 82746 ASSAY OF FOLIC ACID SERUM: CPT

## 2019-01-01 PROCEDURE — 83735 ASSAY OF MAGNESIUM: CPT | Performed by: EMERGENCY MEDICINE

## 2019-01-01 PROCEDURE — 87186 SC STD MICRODIL/AGAR DIL: CPT | Performed by: EMERGENCY MEDICINE

## 2019-01-01 PROCEDURE — 74420 UROGRAPHY RTRGR +-KUB: CPT

## 2019-01-01 PROCEDURE — BT1F1ZZ FLUOROSCOPY OF LEFT KIDNEY, URETER AND BLADDER USING LOW OSMOLAR CONTRAST: ICD-10-PCS | Performed by: UROLOGY

## 2019-01-01 PROCEDURE — 85025 COMPLETE CBC W/AUTO DIFF WBC: CPT

## 2019-01-01 PROCEDURE — 85730 THROMBOPLASTIN TIME PARTIAL: CPT | Performed by: EMERGENCY MEDICINE

## 2019-01-01 PROCEDURE — 99213 OFFICE O/P EST LOW 20 MIN: CPT | Performed by: PODIATRIST

## 2019-01-01 PROCEDURE — 99285 EMERGENCY DEPT VISIT HI MDM: CPT

## 2019-01-01 PROCEDURE — 97163 PT EVAL HIGH COMPLEX 45 MIN: CPT

## 2019-01-01 PROCEDURE — 85025 COMPLETE CBC W/AUTO DIFF WBC: CPT | Performed by: EMERGENCY MEDICINE

## 2019-01-01 PROCEDURE — G8978 MOBILITY CURRENT STATUS: HCPCS

## 2019-01-01 PROCEDURE — 80048 BASIC METABOLIC PNL TOTAL CA: CPT | Performed by: STUDENT IN AN ORGANIZED HEALTH CARE EDUCATION/TRAINING PROGRAM

## 2019-01-01 PROCEDURE — 99214 OFFICE O/P EST MOD 30 MIN: CPT | Performed by: PHYSICIAN ASSISTANT

## 2019-01-01 PROCEDURE — 97110 THERAPEUTIC EXERCISES: CPT

## 2019-01-01 PROCEDURE — 74177 CT ABD & PELVIS W/CONTRAST: CPT

## 2019-01-01 PROCEDURE — 99223 1ST HOSP IP/OBS HIGH 75: CPT | Performed by: INTERNAL MEDICINE

## 2019-01-01 PROCEDURE — 80053 COMPREHEN METABOLIC PANEL: CPT

## 2019-01-01 PROCEDURE — 87077 CULTURE AEROBIC IDENTIFY: CPT

## 2019-01-01 PROCEDURE — 81001 URINALYSIS AUTO W/SCOPE: CPT | Performed by: EMERGENCY MEDICINE

## 2019-01-01 PROCEDURE — 87086 URINE CULTURE/COLONY COUNT: CPT

## 2019-01-01 PROCEDURE — 99232 SBSQ HOSP IP/OBS MODERATE 35: CPT | Performed by: STUDENT IN AN ORGANIZED HEALTH CARE EDUCATION/TRAINING PROGRAM

## 2019-01-01 PROCEDURE — 87086 URINE CULTURE/COLONY COUNT: CPT | Performed by: EMERGENCY MEDICINE

## 2019-01-01 PROCEDURE — 99204 OFFICE O/P NEW MOD 45 MIN: CPT | Performed by: ANESTHESIOLOGY

## 2019-01-01 PROCEDURE — 90670 PCV13 VACCINE IM: CPT | Performed by: UROLOGY

## 2019-01-01 PROCEDURE — 93005 ELECTROCARDIOGRAM TRACING: CPT

## 2019-01-01 PROCEDURE — 80053 COMPREHEN METABOLIC PANEL: CPT | Performed by: EMERGENCY MEDICINE

## 2019-01-01 PROCEDURE — 1123F ACP DISCUSS/DSCN MKR DOCD: CPT | Performed by: STUDENT IN AN ORGANIZED HEALTH CARE EDUCATION/TRAINING PROGRAM

## 2019-01-01 PROCEDURE — 99239 HOSP IP/OBS DSCHRG MGMT >30: CPT | Performed by: STUDENT IN AN ORGANIZED HEALTH CARE EDUCATION/TRAINING PROGRAM

## 2019-01-01 PROCEDURE — 96374 THER/PROPH/DIAG INJ IV PUSH: CPT

## 2019-01-01 PROCEDURE — 81001 URINALYSIS AUTO W/SCOPE: CPT | Performed by: INTERNAL MEDICINE

## 2019-01-01 PROCEDURE — 87186 SC STD MICRODIL/AGAR DIL: CPT

## 2019-01-01 PROCEDURE — 93010 ELECTROCARDIOGRAM REPORT: CPT | Performed by: INTERNAL MEDICINE

## 2019-01-01 PROCEDURE — 87077 CULTURE AEROBIC IDENTIFY: CPT | Performed by: EMERGENCY MEDICINE

## 2019-01-01 PROCEDURE — 99214 OFFICE O/P EST MOD 30 MIN: CPT | Performed by: PSYCHIATRY & NEUROLOGY

## 2019-01-01 PROCEDURE — 72192 CT PELVIS W/O DYE: CPT

## 2019-01-01 PROCEDURE — 85610 PROTHROMBIN TIME: CPT | Performed by: STUDENT IN AN ORGANIZED HEALTH CARE EDUCATION/TRAINING PROGRAM

## 2019-01-01 PROCEDURE — G0009 ADMIN PNEUMOCOCCAL VACCINE: HCPCS | Performed by: UROLOGY

## 2019-01-01 PROCEDURE — 82607 VITAMIN B-12: CPT

## 2019-01-01 PROCEDURE — 85610 PROTHROMBIN TIME: CPT | Performed by: EMERGENCY MEDICINE

## 2019-01-01 PROCEDURE — BT101ZZ FLUOROSCOPY OF BLADDER USING LOW OSMOLAR CONTRAST: ICD-10-PCS | Performed by: UROLOGY

## 2019-01-01 PROCEDURE — 80053 COMPREHEN METABOLIC PANEL: CPT | Performed by: INTERNAL MEDICINE

## 2019-01-01 PROCEDURE — G8979 MOBILITY GOAL STATUS: HCPCS

## 2019-01-01 PROCEDURE — 96361 HYDRATE IV INFUSION ADD-ON: CPT

## 2019-01-01 PROCEDURE — 85027 COMPLETE CBC AUTOMATED: CPT | Performed by: STUDENT IN AN ORGANIZED HEALTH CARE EDUCATION/TRAINING PROGRAM

## 2019-01-01 PROCEDURE — 36415 COLL VENOUS BLD VENIPUNCTURE: CPT | Performed by: EMERGENCY MEDICINE

## 2019-01-01 PROCEDURE — 85610 PROTHROMBIN TIME: CPT | Performed by: INTERNAL MEDICINE

## 2019-01-01 PROCEDURE — C1769 GUIDE WIRE: HCPCS | Performed by: UROLOGY

## 2019-01-01 PROCEDURE — 85027 COMPLETE CBC AUTOMATED: CPT | Performed by: INTERNAL MEDICINE

## 2019-01-01 RX ORDER — BACLOFEN 10 MG/1
5 TABLET ORAL 3 TIMES DAILY
Status: DISCONTINUED | OUTPATIENT
Start: 2019-01-01 | End: 2019-01-01 | Stop reason: HOSPADM

## 2019-01-01 RX ORDER — AMMONIUM LACTATE 12 G/100G
LOTION TOPICAL 2 TIMES DAILY PRN
Qty: 400 G | Refills: 0 | Status: SHIPPED | OUTPATIENT
Start: 2019-01-01 | End: 2019-01-01 | Stop reason: SDUPTHER

## 2019-01-01 RX ORDER — PROPOFOL 10 MG/ML
INJECTION, EMULSION INTRAVENOUS AS NEEDED
Status: DISCONTINUED | OUTPATIENT
Start: 2019-01-01 | End: 2019-01-01 | Stop reason: SURG

## 2019-01-01 RX ORDER — AMITRIPTYLINE HYDROCHLORIDE 25 MG/1
25 TABLET, FILM COATED ORAL
Qty: 30 TABLET | Refills: 2 | Status: SHIPPED | OUTPATIENT
Start: 2019-01-01 | End: 2019-01-01 | Stop reason: SDUPTHER

## 2019-01-01 RX ORDER — ACETAMINOPHEN 325 MG/1
650 TABLET ORAL EVERY 6 HOURS PRN
Qty: 30 TABLET | Refills: 0
Start: 2019-01-01

## 2019-01-01 RX ORDER — WARFARIN SODIUM 5 MG/1
5 TABLET ORAL
Status: COMPLETED | OUTPATIENT
Start: 2019-01-01 | End: 2019-01-01

## 2019-01-01 RX ORDER — VENLAFAXINE HYDROCHLORIDE 150 MG/1
150 CAPSULE, EXTENDED RELEASE ORAL DAILY
Status: DISCONTINUED | OUTPATIENT
Start: 2019-01-01 | End: 2019-01-01 | Stop reason: HOSPADM

## 2019-01-01 RX ORDER — SODIUM CHLORIDE 9 MG/ML
100 INJECTION, SOLUTION INTRAVENOUS CONTINUOUS
Status: DISCONTINUED | OUTPATIENT
Start: 2019-01-01 | End: 2019-01-01

## 2019-01-01 RX ORDER — AMMONIUM LACTATE 12 G/100G
1 LOTION TOPICAL 2 TIMES DAILY PRN
Status: DISCONTINUED | OUTPATIENT
Start: 2019-01-01 | End: 2019-01-01 | Stop reason: HOSPADM

## 2019-01-01 RX ORDER — ONDANSETRON 2 MG/ML
4 INJECTION INTRAMUSCULAR; INTRAVENOUS ONCE AS NEEDED
Status: DISCONTINUED | OUTPATIENT
Start: 2019-01-01 | End: 2019-01-01 | Stop reason: HOSPADM

## 2019-01-01 RX ORDER — GABAPENTIN 300 MG/1
CAPSULE ORAL
Qty: 150 CAPSULE | Refills: 4 | Status: SHIPPED | OUTPATIENT
Start: 2019-01-01 | End: 2019-01-01

## 2019-01-01 RX ORDER — HEPARIN SODIUM 5000 [USP'U]/ML
5000 INJECTION, SOLUTION INTRAVENOUS; SUBCUTANEOUS EVERY 8 HOURS SCHEDULED
Status: DISCONTINUED | OUTPATIENT
Start: 2019-01-01 | End: 2019-01-01 | Stop reason: HOSPADM

## 2019-01-01 RX ORDER — AMMONIUM LACTATE 12 G/100G
LOTION TOPICAL 2 TIMES DAILY PRN
Qty: 400 G | Refills: 1 | Status: SHIPPED | OUTPATIENT
Start: 2019-01-01 | End: 2019-01-01

## 2019-01-01 RX ORDER — ACETAMINOPHEN 325 MG/1
650 TABLET ORAL EVERY 6 HOURS PRN
Status: DISCONTINUED | OUTPATIENT
Start: 2019-01-01 | End: 2019-01-01 | Stop reason: HOSPADM

## 2019-01-01 RX ORDER — TOPIRAMATE 25 MG/1
25 TABLET ORAL 2 TIMES DAILY
Status: DISCONTINUED | OUTPATIENT
Start: 2019-01-01 | End: 2019-01-01 | Stop reason: HOSPADM

## 2019-01-01 RX ORDER — PROPOFOL 10 MG/ML
INJECTION, EMULSION INTRAVENOUS CONTINUOUS PRN
Status: DISCONTINUED | OUTPATIENT
Start: 2019-01-01 | End: 2019-01-01 | Stop reason: SURG

## 2019-01-01 RX ORDER — MYCOPHENOLATE MOFETIL 250 MG/1
500 CAPSULE ORAL DAILY
Status: DISCONTINUED | OUTPATIENT
Start: 2019-01-01 | End: 2019-01-01 | Stop reason: HOSPADM

## 2019-01-01 RX ORDER — MYCOPHENOLATE MOFETIL 500 MG/1
TABLET ORAL
Qty: 30 TABLET | Refills: 2 | Status: SHIPPED | OUTPATIENT
Start: 2019-01-01 | End: 2019-01-01 | Stop reason: SDUPTHER

## 2019-01-01 RX ORDER — HEPARIN SODIUM 5000 [USP'U]/ML
5000 INJECTION, SOLUTION INTRAVENOUS; SUBCUTANEOUS EVERY 8 HOURS SCHEDULED
Qty: 1 ML | Refills: 0 | Status: SHIPPED | OUTPATIENT
Start: 2019-01-01

## 2019-01-01 RX ORDER — VENLAFAXINE HYDROCHLORIDE 150 MG/1
CAPSULE, EXTENDED RELEASE ORAL
Qty: 30 CAPSULE | Refills: 4 | Status: SHIPPED | OUTPATIENT
Start: 2019-01-01 | End: 2019-01-01 | Stop reason: SDUPTHER

## 2019-01-01 RX ORDER — MAGNESIUM HYDROXIDE 1200 MG/15ML
LIQUID ORAL AS NEEDED
Status: DISCONTINUED | OUTPATIENT
Start: 2019-01-01 | End: 2019-01-01 | Stop reason: HOSPADM

## 2019-01-01 RX ORDER — LEVOTHYROXINE SODIUM 0.12 MG/1
125 TABLET ORAL EVERY MORNING
Status: DISCONTINUED | OUTPATIENT
Start: 2019-01-01 | End: 2019-01-01 | Stop reason: HOSPADM

## 2019-01-01 RX ORDER — WARFARIN SODIUM 4 MG/1
4 TABLET ORAL
Status: DISCONTINUED | OUTPATIENT
Start: 2019-01-01 | End: 2019-01-01

## 2019-01-01 RX ORDER — BENZONATATE 100 MG/1
100 CAPSULE ORAL 3 TIMES DAILY PRN
Status: DISCONTINUED | OUTPATIENT
Start: 2019-01-01 | End: 2019-01-01 | Stop reason: HOSPADM

## 2019-01-01 RX ORDER — AMMONIUM LACTATE 12 G/100G
LOTION TOPICAL 2 TIMES DAILY PRN
Qty: 400 G | Refills: 0 | Status: SHIPPED | OUTPATIENT
Start: 2019-01-01 | End: 2020-01-01

## 2019-01-01 RX ORDER — CEPHALEXIN 500 MG/1
500 CAPSULE ORAL EVERY 6 HOURS SCHEDULED
Status: DISCONTINUED | OUTPATIENT
Start: 2019-01-01 | End: 2019-01-01 | Stop reason: HOSPADM

## 2019-01-01 RX ORDER — VENLAFAXINE HYDROCHLORIDE 150 MG/1
CAPSULE, EXTENDED RELEASE ORAL
Qty: 30 CAPSULE | Refills: 4 | Status: SHIPPED | OUTPATIENT
Start: 2019-01-01

## 2019-01-01 RX ORDER — ONDANSETRON 2 MG/ML
4 INJECTION INTRAMUSCULAR; INTRAVENOUS EVERY 4 HOURS PRN
Status: DISCONTINUED | OUTPATIENT
Start: 2019-01-01 | End: 2019-01-01 | Stop reason: HOSPADM

## 2019-01-01 RX ORDER — TOPIRAMATE 25 MG/1
25 TABLET ORAL 2 TIMES DAILY
Qty: 60 TABLET | Refills: 0 | Status: SHIPPED | OUTPATIENT
Start: 2019-01-01

## 2019-01-01 RX ORDER — BACLOFEN 5 MG/1
5 TABLET ORAL 3 TIMES DAILY
Qty: 90 TABLET | Refills: 0 | Status: SHIPPED | OUTPATIENT
Start: 2019-01-01

## 2019-01-01 RX ORDER — GABAPENTIN 300 MG/1
CAPSULE ORAL
Qty: 150 CAPSULE | Refills: 4 | Status: SHIPPED | OUTPATIENT
Start: 2019-01-01 | End: 2019-01-01 | Stop reason: SDUPTHER

## 2019-01-01 RX ORDER — FENTANYL CITRATE 50 UG/ML
INJECTION, SOLUTION INTRAMUSCULAR; INTRAVENOUS AS NEEDED
Status: DISCONTINUED | OUTPATIENT
Start: 2019-01-01 | End: 2019-01-01 | Stop reason: SURG

## 2019-01-01 RX ORDER — AMITRIPTYLINE HYDROCHLORIDE 25 MG/1
25 TABLET, FILM COATED ORAL
Qty: 30 TABLET | Refills: 2 | Status: SHIPPED | OUTPATIENT
Start: 2019-01-01

## 2019-01-01 RX ORDER — CEFTRIAXONE 1 G/50ML
1000 INJECTION, SOLUTION INTRAVENOUS ONCE
Status: COMPLETED | OUTPATIENT
Start: 2019-01-01 | End: 2019-01-01

## 2019-01-01 RX ORDER — BENZONATATE 100 MG/1
100 CAPSULE ORAL 3 TIMES DAILY PRN
Qty: 20 CAPSULE | Refills: 0
Start: 2019-01-01

## 2019-01-01 RX ORDER — MYCOPHENOLATE MOFETIL 500 MG/1
TABLET ORAL
Qty: 30 TABLET | Refills: 2 | Status: SHIPPED | OUTPATIENT
Start: 2019-01-01

## 2019-01-01 RX ORDER — CEPHALEXIN 500 MG/1
500 CAPSULE ORAL EVERY 6 HOURS SCHEDULED
Qty: 11 CAPSULE | Refills: 0
Start: 2019-01-01 | End: 2019-01-01

## 2019-01-01 RX ORDER — SODIUM CHLORIDE 9 MG/ML
INJECTION, SOLUTION INTRAVENOUS CONTINUOUS PRN
Status: DISCONTINUED | OUTPATIENT
Start: 2019-01-01 | End: 2019-01-01 | Stop reason: SURG

## 2019-01-01 RX ORDER — AMITRIPTYLINE HYDROCHLORIDE 10 MG/1
25 TABLET, FILM COATED ORAL
Status: DISCONTINUED | OUTPATIENT
Start: 2019-01-01 | End: 2019-01-01 | Stop reason: HOSPADM

## 2019-01-01 RX ORDER — TOPIRAMATE 25 MG/1
25 TABLET ORAL 2 TIMES DAILY
Qty: 60 TABLET | Refills: 0 | Status: SHIPPED | OUTPATIENT
Start: 2019-01-01 | End: 2019-01-01 | Stop reason: SDUPTHER

## 2019-01-01 RX ORDER — COLLAGENASE SANTYL 250 [ARB'U]/G
OINTMENT TOPICAL
Refills: 1 | COMMUNITY
Start: 2019-01-01 | End: 2019-01-01

## 2019-01-01 RX ADMIN — SODIUM CHLORIDE 1000 ML: 0.9 INJECTION, SOLUTION INTRAVENOUS at 18:24

## 2019-01-01 RX ADMIN — PIPERACILLIN SODIUM AND TAZOBACTAM SODIUM 3.38 G: 36; 4.5 INJECTION, POWDER, FOR SOLUTION INTRAVENOUS at 12:16

## 2019-01-01 RX ADMIN — BACLOFEN 5 MG: 10 TABLET ORAL at 17:43

## 2019-01-01 RX ADMIN — HEPARIN SODIUM 5000 UNITS: 5000 INJECTION INTRAVENOUS; SUBCUTANEOUS at 00:13

## 2019-01-01 RX ADMIN — BACLOFEN 5 MG: 10 TABLET ORAL at 16:32

## 2019-01-01 RX ADMIN — BACLOFEN 5 MG: 10 TABLET ORAL at 00:02

## 2019-01-01 RX ADMIN — AMITRIPTYLINE HYDROCHLORIDE 25 MG: 10 TABLET, FILM COATED ORAL at 22:06

## 2019-01-01 RX ADMIN — BACLOFEN 5 MG: 10 TABLET ORAL at 10:40

## 2019-01-01 RX ADMIN — FENTANYL CITRATE 25 MCG: 50 INJECTION, SOLUTION INTRAMUSCULAR; INTRAVENOUS at 12:16

## 2019-01-01 RX ADMIN — PROPOFOL 120 MCG/KG/MIN: 10 INJECTION, EMULSION INTRAVENOUS at 12:16

## 2019-01-01 RX ADMIN — TOPIRAMATE 25 MG: 25 TABLET, FILM COATED ORAL at 08:43

## 2019-01-01 RX ADMIN — SODIUM CHLORIDE 75 ML/HR: 0.9 INJECTION, SOLUTION INTRAVENOUS at 23:37

## 2019-01-01 RX ADMIN — BENZONATATE 100 MG: 100 CAPSULE ORAL at 16:34

## 2019-01-01 RX ADMIN — TOPIRAMATE 25 MG: 25 TABLET, FILM COATED ORAL at 00:42

## 2019-01-01 RX ADMIN — MYCOPHENOLATE MOFETIL 500 MG: 250 CAPSULE ORAL at 08:42

## 2019-01-01 RX ADMIN — HEPARIN SODIUM 5000 UNITS: 5000 INJECTION INTRAVENOUS; SUBCUTANEOUS at 22:07

## 2019-01-01 RX ADMIN — CEFTRIAXONE 1000 MG: 1 INJECTION, SOLUTION INTRAVENOUS at 21:39

## 2019-01-01 RX ADMIN — PIPERACILLIN SODIUM AND TAZOBACTAM SODIUM 3.38 G: 36; 4.5 INJECTION, POWDER, FOR SOLUTION INTRAVENOUS at 18:18

## 2019-01-01 RX ADMIN — PIPERACILLIN SODIUM AND TAZOBACTAM SODIUM 3.38 G: 36; 4.5 INJECTION, POWDER, FOR SOLUTION INTRAVENOUS at 00:38

## 2019-01-01 RX ADMIN — HEPARIN SODIUM 5000 UNITS: 5000 INJECTION INTRAVENOUS; SUBCUTANEOUS at 13:27

## 2019-01-01 RX ADMIN — BACLOFEN 5 MG: 10 TABLET ORAL at 08:43

## 2019-01-01 RX ADMIN — WARFARIN SODIUM 4 MG: 4 TABLET ORAL at 00:02

## 2019-01-01 RX ADMIN — TOPIRAMATE 25 MG: 25 TABLET, FILM COATED ORAL at 09:46

## 2019-01-01 RX ADMIN — TOPIRAMATE 25 MG: 25 TABLET, FILM COATED ORAL at 18:18

## 2019-01-01 RX ADMIN — WARFARIN SODIUM 5 MG: 5 TABLET ORAL at 17:43

## 2019-01-01 RX ADMIN — LEVOTHYROXINE SODIUM 125 MCG: 125 TABLET ORAL at 06:13

## 2019-01-01 RX ADMIN — AMITRIPTYLINE HYDROCHLORIDE 25 MG: 10 TABLET, FILM COATED ORAL at 21:33

## 2019-01-01 RX ADMIN — CEPHALEXIN 500 MG: 500 CAPSULE ORAL at 12:03

## 2019-01-01 RX ADMIN — AMITRIPTYLINE HYDROCHLORIDE 25 MG: 10 TABLET, FILM COATED ORAL at 00:03

## 2019-01-01 RX ADMIN — SODIUM CHLORIDE: 0.9 INJECTION, SOLUTION INTRAVENOUS at 12:06

## 2019-01-01 RX ADMIN — VENLAFAXINE HYDROCHLORIDE 150 MG: 150 CAPSULE, EXTENDED RELEASE ORAL at 08:43

## 2019-01-01 RX ADMIN — BACLOFEN 5 MG: 10 TABLET ORAL at 16:43

## 2019-01-01 RX ADMIN — BACLOFEN 5 MG: 10 TABLET ORAL at 22:04

## 2019-01-01 RX ADMIN — FENTANYL CITRATE 25 MCG: 50 INJECTION, SOLUTION INTRAMUSCULAR; INTRAVENOUS at 12:20

## 2019-01-01 RX ADMIN — HEPARIN SODIUM 5000 UNITS: 5000 INJECTION INTRAVENOUS; SUBCUTANEOUS at 21:37

## 2019-01-01 RX ADMIN — CEPHALEXIN 500 MG: 500 CAPSULE ORAL at 09:48

## 2019-01-01 RX ADMIN — BENZONATATE 100 MG: 100 CAPSULE ORAL at 00:59

## 2019-01-01 RX ADMIN — BENZONATATE 100 MG: 100 CAPSULE ORAL at 17:43

## 2019-01-01 RX ADMIN — MYCOPHENOLATE MOFETIL 500 MG: 250 CAPSULE ORAL at 09:46

## 2019-01-01 RX ADMIN — PROPOFOL 50 MG: 10 INJECTION, EMULSION INTRAVENOUS at 12:16

## 2019-01-01 RX ADMIN — BENZONATATE 100 MG: 100 CAPSULE ORAL at 14:04

## 2019-01-01 RX ADMIN — BACLOFEN 5 MG: 10 TABLET ORAL at 21:37

## 2019-01-01 RX ADMIN — PIPERACILLIN SODIUM AND TAZOBACTAM SODIUM 3.38 G: 36; 4.5 INJECTION, POWDER, FOR SOLUTION INTRAVENOUS at 11:29

## 2019-01-01 RX ADMIN — TOPIRAMATE 25 MG: 25 TABLET, FILM COATED ORAL at 17:43

## 2019-01-01 RX ADMIN — PIPERACILLIN SODIUM AND TAZOBACTAM SODIUM 3.38 G: 36; 4.5 INJECTION, POWDER, FOR SOLUTION INTRAVENOUS at 06:07

## 2019-01-01 RX ADMIN — HEPARIN SODIUM 5000 UNITS: 5000 INJECTION INTRAVENOUS; SUBCUTANEOUS at 14:04

## 2019-01-01 RX ADMIN — PNEUMOCOCCAL 13-VALENT CONJUGATE VACCINE 0.5 ML: 2.2; 2.2; 2.2; 2.2; 2.2; 4.4; 2.2; 2.2; 2.2; 2.2; 2.2; 2.2; 2.2 INJECTION, SUSPENSION INTRAMUSCULAR at 06:13

## 2019-01-01 RX ADMIN — VENLAFAXINE HYDROCHLORIDE 150 MG: 150 CAPSULE, EXTENDED RELEASE ORAL at 09:46

## 2019-01-01 RX ADMIN — PIPERACILLIN SODIUM AND TAZOBACTAM SODIUM 3.38 G: 36; 4.5 INJECTION, POWDER, FOR SOLUTION INTRAVENOUS at 23:40

## 2019-01-01 RX ADMIN — SODIUM CHLORIDE 100 ML/HR: 0.9 INJECTION, SOLUTION INTRAVENOUS at 10:38

## 2019-01-01 RX ADMIN — LEVOTHYROXINE SODIUM 125 MCG: 125 TABLET ORAL at 06:40

## 2019-01-01 RX ADMIN — TOPIRAMATE 25 MG: 25 TABLET, FILM COATED ORAL at 10:40

## 2019-01-01 RX ADMIN — BENZONATATE 100 MG: 100 CAPSULE ORAL at 06:13

## 2019-01-01 RX ADMIN — HEPARIN SODIUM 5000 UNITS: 5000 INJECTION INTRAVENOUS; SUBCUTANEOUS at 06:13

## 2019-01-01 RX ADMIN — FENTANYL CITRATE 50 MCG: 50 INJECTION, SOLUTION INTRAMUSCULAR; INTRAVENOUS at 12:14

## 2019-01-01 RX ADMIN — PIPERACILLIN SODIUM AND TAZOBACTAM SODIUM 3.38 G: 36; 4.5 INJECTION, POWDER, FOR SOLUTION INTRAVENOUS at 06:40

## 2019-01-01 RX ADMIN — MYCOPHENOLATE MOFETIL 500 MG: 250 CAPSULE ORAL at 10:39

## 2019-01-01 RX ADMIN — BENZONATATE 100 MG: 100 CAPSULE ORAL at 06:40

## 2019-01-01 RX ADMIN — VENLAFAXINE HYDROCHLORIDE 150 MG: 150 CAPSULE, EXTENDED RELEASE ORAL at 10:40

## 2019-01-01 RX ADMIN — IOHEXOL 100 ML: 350 INJECTION, SOLUTION INTRAVENOUS at 19:42

## 2019-01-01 RX ADMIN — BACLOFEN 5 MG: 10 TABLET ORAL at 09:46

## 2019-02-18 NOTE — TELEPHONE ENCOUNTER
----- Message from Sheila Gross MD sent at 2/13/2019  2:12 PM EST -----  Let pt know glu slightly low at 64 and alb low at 2 9    Send labs to pcp

## 2019-02-18 NOTE — TELEPHONE ENCOUNTER
Patient made aware and will followup with PCP  PCP is in [address] [place] [address] Rd

## 2019-02-27 PROBLEM — R60.9 CHRONIC EDEMA: Status: ACTIVE | Noted: 2019-01-01

## 2019-02-27 PROBLEM — B35.9 DERMATOPHYTOSIS: Status: ACTIVE | Noted: 2019-01-01
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
## 2019-05-31 PROBLEM — L02.611 ABSCESS, TOE, RIGHT: Status: ACTIVE | Noted: 2019-01-01

## 2019-08-06 NOTE — PATIENT INSTRUCTIONS
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[facility] )  Pt here today with her daughter  Last seen in April  Pt now seen at [place] office as much easier for pt for transportation  Pt remains on cellcept  Last labs in feb  Pt is overdue for labs  Pt also with new sacral decub and seeing wound care from [place] services  Pt is followed in the offices and may need a wound vac   Next appt Thursday this week  Pt also needs physcial therapy preferrably outpt facility  Pt also in need of air mattress due positioning , skin breakdown, decubitus and immobility related to her nmo  Will ask sw to help with getting mattress  Also pt having some diff with eating due to poor denture fitting  Pts albumin from feb slightly low at 2 9  Also rec nutritional services eval for any supplemental nutritional support  rec ensure and boost

## 2019-08-06 NOTE — PROGRESS NOTES
Patient ID: Criselda Muhammad is a 66 y o  female  Assessment/Plan:    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[facility] )  Pt here today with her daughter  Last seen in April  Pt now seen at [place] office as much easier for pt for transportation  Pt remains on cellcept  Last labs in feb  Pt is overdue for labs  Pt also with new sacral decub and seeing wound care from [place] services  Pt is followed in the offices and may need a wound vac   Next appt Thursday this week  Pt also needs physcial therapy preferrably outpt facility  Pt also in need of air mattress due positioning , skin breakdown, decubitus and immobility related to her nmo  Will ask sw to help with getting mattress  Also pt having some diff with eating due to poor denture fitting  Pts albumin from feb slightly low at 2 9  Also rec nutritional services eval for any supplemental nutritional support  rec ensure and boost  Pt to cont to updated optho evals  Pt also needs updated c and t spine cord imaging       Diagnoses and all orders for this visit:    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[facility] )  -     Comprehensive metabolic panel; Future  -     CBC and differential; Future  -     Vitamin B12; Future  -     Zinc; Future  -     Folate; Future  -     MRI cervical spine with and without contrast; Future  -     MRI thoracic spine without contrast; Future  -     Ambulatory referral to Nutrition Services; Future  -     Ambulatory referral to Physical Therapy; Future    Polyneuropathy  -     Comprehensive metabolic panel; Future  -     CBC and differential; Future  -     Vitamin B12; Future  -     Zinc; Future  -     Folate; Future  -     MRI cervical spine with and without contrast; Future  -     MRI thoracic spine without contrast; Future    Neuropathic bladders  -     Comprehensive metabolic panel; Future  -     CBC and differential; Future  -     Vitamin B12; Future  -     Zinc; Future  -     Folate;  Future  -     MRI cervical spine with and without contrast; Future  -     MRI thoracic spine without contrast; Future    Decreased oral intake  -     Ambulatory referral to Nutrition Services; Future    Other orders  -     SANTYL ointment; APPLY TO AFFECTED AREA EVERY DAY  -     collagenase (SANTYL) ointment; Apply topically daily           Subjective:    HPI      67 y/o female presents for NMO follow up, last seen in 4/2/19  Pt here with her daughter  Pt last seen by greg min on 4/2/19  No recent infections  No recent hosptalizations  No fever or chills  No recent steroids  Biggest issue at this time is a sacral decub and pt is doctoring with [facility]  Pt may need a wound vac like she had for her prior decub yrs ago  Pt notes she has issues with her current bed  Pt would greatly benefit from repositioning with the use of an air mattress  Will ask sw to look into this for pt as well  Pt notes area of decub is right around the anus and midline  Pt notes due to decreased truncal stability, she tends to slip down in both her power chair and in bed and tends to have more pressure on this particular area  Pt notes no change in vision  Pt does goes 1-2 times annually to optho  Biggest issue now is that pts [place] does not have a working airconditioner and summer heat particularly bad on her with traveling  Pt now coming to [place] office as closer for her  Pt notes no change in vision  No loss of vision  No diplopia  Pt feels she is missing getting out to physical therapy and does not feel like she gets the same work out at home sessions  Pt feels she has plateaued with home sessions and needs more help due to increased deconditioning  Pt also recommended to have updated mri c and t spine imaging to update her cord  No change in speech or swallowing  No falls  Pt dependendent on her power chair  Pt also has home follow up with urology nursing for change of supplies with her suprapubic catheter  Pt continues on her cellcept  Pt also needs to cont to follow up with her pcp    Pt notes no recent infections  Last labs were in feb 2019 with wbc of 5 91  Abs lymphs of 1 17, abs neutrophils of 3 60  Pt with low alb of 2 9   rec re re eval with nutrition and pcp  Pt rec to see pcp  Pt notes decreased po intake due to poor fitting lower dentures  Pt has tried to get this corrected but has become a longer process and not overly successful  Will ask sw to look into PT , nutrition, and air mattress needs  No recent flares  Pt notes less spasticity overall  No change in bowels  No vertigo  No ha or n or v   No loc  No sz  Pt remains an excellent historian for her sxs and her adl needs  Total time spent today 30 minutes  Greater than 50% of total time was spent with the patient and / or family counseling and / or coordination of care          The following portions of the patient's history were reviewed and updated as appropriate: allergies, current medications, past family history, past medical history, past social history, past surgical history and problem list and ros rev  Objective:    Blood pressure 130/84, pulse 94  Physical Exam   Constitutional: She appears well-developed and well-nourished  Eyes: Pupils are equal, round, and reactive to light  Lids are normal    Cardiovascular: Intact distal pulses  Neurological:   Reflex Scores:       Tricep reflexes are 2+ on the right side and 2+ on the left side  Bicep reflexes are 2+ on the right side and 2+ on the left side  Brachioradialis reflexes are 2+ on the right side and 2+ on the left side  Patellar reflexes are 2+ on the right side and 2+ on the left side  Achilles reflexes are 1+ on the right side and 1+ on the left side  Psychiatric: Her speech is normal        Neurological Exam  Mental Status  Awake, alert and oriented to person, place and time  Recent and remote memory are intact  Speech is normal  Language is fluent with no aphasia   Attention and concentration are normal  [facility] knowledge is appropriate for level of education  Cranial Nerves  CN II: Visual acuity is normal  Visual fields full to confrontation  CN III, IV, VI: Extraocular movements intact bilaterally  Normal lids and orbits bilaterally  Pupils equal round and reactive to light bilaterally  CN V: Facial sensation is normal   CN VII: Full and symmetric facial movement  CN VIII: Hearing is normal   CN IX, X: Palate elevates symmetrically  Normal gag reflex  CN XI: Shoulder shrug strength is normal   CN XII: Tongue midline without atrophy or fasciculations  Motor  Normal muscle bulk throughout  Increased muscle tone  No abnormal involuntary movements  Bilateral lower ext weakness at 2/5 on right and 1/5 on the left  peng upper ext weakness at 4/5  Sensory  Dec vib peng lowers  Reflexes                                           Right                      Left  Brachioradialis                    2+                         2+  Biceps                                 2+                         2+  Triceps                                2+                         2+  Finger flex                           2+                         2+  Hamstring                            2+                         2+  Patellar                                2+                         2+  Achilles                                1+                         1+  Plantar                           Downgoing                Downgoing    Coordination  Right: Finger-to-nose normal  Rapid alternating movement normal   Left: Finger-to-nose normal  Rapid alternating movement normal     Gait  Casual gait:  Non ambulatory , in motorized chair  ROS:    Review of Systems   Constitutional: Positive for chills, fever and unexpected weight change  Negative for appetite change  HENT: Negative  Negative for hearing loss, tinnitus, trouble swallowing and voice change  Hoarseness   Eyes: Negative  Negative for photophobia and pain          Dry eyes     Respiratory: Positive for cough  Negative for shortness of breath  Cardiovascular: Negative  Negative for palpitations  Gastrointestinal: Negative  Negative for nausea and vomiting  Loss pf bowel control   Endocrine: Negative  Negative for cold intolerance and heat intolerance  Genitourinary: Negative  Negative for dysuria, frequency and urgency  Loss of bladder control   Musculoskeletal: Positive for arthralgias, back pain, gait problem, myalgias and neck pain  Skin: Negative  Negative for rash  Neurological: Positive for light-headedness and numbness  Negative for dizziness, tremors, seizures, syncope, facial asymmetry, speech difficulty, weakness and headaches  Tingling     Hematological: Negative  Does not bruise/bleed easily  Psychiatric/Behavioral: Positive for sleep disturbance  Negative for confusion and hallucinations  The patient is nervous/anxious           Depression

## 2019-08-06 NOTE — ASSESSMENT & PLAN NOTE
Pt here today with her daughter  Last seen in April  Pt now seen at [place] office as much easier for pt for transportation  Pt remains on cellcept  Last labs in feb  Pt is overdue for labs  Pt also with new sacral decub and seeing wound care from [place] services  Pt is followed in the offices and may need a wound vac   Next appt Thursday this week  Pt also needs physcial therapy preferrably outpt facility  Pt also in need of air mattress due positioning , skin breakdown, decubitus and immobility related to her nmo  Will ask sw to help with getting mattress  Also pt having some diff with eating due to poor denture fitting  Pts albumin from feb slightly low at 2 9  Also rec nutritional services eval for any supplemental nutritional support  rec ensure and boost  Pt to cont to updated optho evals  Pt also needs updated c and t spine cord imaging

## 2019-08-07 NOTE — TELEPHONE ENCOUNTER
Obey Reeves, pt seen in office yest at [address]  Pt will be having change of her insurance in the future  She is having issues getting air mattress  Pt with nmo/ devics with significant weakness of her lower ext, suprapubic catheter and reliable on family for care  Pt now with a sacral decub and seeing [facility] wound care  Pt is in need of an air mattress at home  She has rx from wound care but having issues navigating services  Also pt with poorly fitting dentures and overall po intake lower with lower albumin in labs  Pt needs a nutritional consutlation as well and possible help with supplemental nutrition like boost or ensure as liquid appears ok, chewing is an issue  Also rec pt to follow up with pcp as well  Also pt is looking into resources to actually go to PT, rather than home PT as she feels she has plateaued with home services from PT and feels she needs more help with her deconditioning  Daughter is helpful getting her to appts  Please see if you can help her with PT, nutritional consultation and possible supplement for now with ensure or boost, and also getting delivery and coverage of her air mattress  One of biggest indications of her getting air mattress is her new dx of decubitus

## 2019-08-07 NOTE — TELEPHONE ENCOUNTER
MSW attempted to reach patient to get more information about her situation  No answer at [phone]  MSW left message requesting callback  Awaiting same

## 2019-08-07 NOTE — TELEPHONE ENCOUNTER
MSW received transferred call from the Clinical Team  Patient stated that she missed this writer's call because her RN Bernadette Coyle) from [facility] was there  Patient stated that the nurse comes there 2-3 times per week  MSW inquired where patient was in the process of attempting to get the air mattress  Patient stated that her wound care center in [place], [place], gave her a script for same and told her to give it to her RN  Patient unsure where the process is beyond that  Patient stated that she previously had a mattress pad from [facility], but that it got a hole it in  Patient stated that because she was laying in the same area, she has now developed this decubitis  MSW advised that the insurance often requires a lot of information about the wound (stage/measurements) to justify coverage of of an air mattress  Patient stated that her wound care center should have this information  MSW requested verbal consent to be able to reach out to patient's RN  Patient was agreeable to same and provided Jessie's number as [phone]  Patient did report that she had an hospital bed initially from her insurance, but her aide could not "pump" it up, so she bought a bed via private pay  Patient reported that she feels that she is "regressing" with the PT that is being provided by [facility] hence why she would like to pursue outpatient therapy  Patient stated that she would have transportation to get the [facility] location; however, MSW advised that insurances do not allow patients to receive in home skilled care and outpatient physical therapy at the same time  MSW inquired if patient is aware if she will be discharged from the [facility]'s services anytime soon  Patient was unsure  MSW will inquire with patient's RN, Chiquis Forte  MSW mentioned Dr Lesli Arreola recommendation for a nutritional consult   Patient stated that she already has same scheduled for Wed 9/4/19 at the [facility]  MSW advised that the dietician should be able to make a recommendation regarding Boost or Ensure  Patient also reports dissatisfaction with her current power wheelchair  Patient reports that it is so high that it is hard to travel in a wheelchair accessible car and to put her legs underneath a table  Patient unsure when she received the electric wheelchair, but knows that it was a doctor from [address] [place] [address] that prescribed it  MSW reviewed that Medicare only covers one assistive walking device every five years, unless there has been a substantial change in patient's condition that can clearly indicate the need for something more elaborate  Patient stated that she has paperwork from when she received her electric chair, so she will check a date on when she received it and will let this writer know  MSW advised that if she would be eligible for a new power chair, that this office would recommend her attending a wheelchair clinic so that the best chair could be selected to meet her needs  MSW attempted to reach patient's RN, Stephanie Macias at [phone] but there was no answer  MSW left message requesting callback regarding need for air mattress and to see how long VNA services will be working with patient  Awaiting callback

## 2019-08-08 NOTE — TELEPHONE ENCOUNTER
MSW had yet to hear from patient's nurse, Porter Davis 81Jan, so MSW placed call to her again at [phone]  No answer  MSW left another message requesting callback  Awaiting same

## 2019-08-13 NOTE — TELEPHONE ENCOUNTER
----- Message from Sincere Epstein MD sent at 8/12/2019  4:18 PM EDT -----  Please review abnormal blood work with borderline hypoglycemia and low B12 with the patient- she is to follow with PCP, and have B12 1000 mcg daily supplements

## 2019-08-14 NOTE — TELEPHONE ENCOUNTER
LATE ENTRY from 8/12/19:    MSW attempted to reach patient's nurse José Miguel Hicks at [phone]  No answer  MSW left message requesting callback again

## 2019-08-14 NOTE — TELEPHONE ENCOUNTER
MSW phoned patient this date to let her know this writer was having a hard time reaching her RN, Nevaeh Spicer  Patient did advise that Nevaeh Spicer is on vacation, but a different nurse will be coming out tomorrow  MSW advised that this writer will attempt to reach [facility] on 8/15 to speak with the covering nurse or supervisor  Patient was in agreement with this plan

## 2019-08-15 NOTE — TELEPHONE ENCOUNTER
MSW reached out to the [facility] which is [facility] at [phone]  MSW spoke with James Tomas who is covering for the manager who is also on vacation  James Tomas stated that she would need to review patient's file to check on the status of the air mattress  James Tomas stated that she would likely not be able to respond to this writer until Monday which is when patient's RN will be back  James Tomas stated that there is no plan for patient to be discharged since they are seeing her for spivey catheter care  James Tomas did state that patient has not had PT since May  James Tomas confirmed that patient cannot have both VNA services and outpatient services at the same time  James Tomas did state that she can look into if there is another therapist that would be able to see patient in hopes that more progress would be able to be made with therapy  MSW phoned patient to provide update  Patient understands that this writer will need to await callback from Porter Davis 8141 (RN) or the VNA manager with update about air mattres  Patient is upset about therapy  Patient stated that her daughter has been doing exercises with her because her legs are becoming harder to bend  Patient does admit that she has not had PT since May, but states that she feels that she makes no progress with the in home therapists  Patient may be open to receiving therapy from another therapist but still voices interest in obtaining outpatient therapy services  MSW advised that in order to have VNA patients must be "homebound", and travelling to outpatient therapy would disqualify her from being homebound  Patient frustrated with this answer as she says that she is able to travel to the [facility]  MSW did advise that that type of travel is infrequent, whereas therapy is often 2-3 times per week  MSW will await feedback from VNA RN/manager

## 2019-08-15 NOTE — TELEPHONE ENCOUNTER
MSW did receive a callback from Chandra Araiza at the [facility]  Chandra Araiza stated that she was able to locate another therapist (a female) who would be able to treat patient  Chandra Araiza stated that she will fax over a request for a PT order to Dr George Castle  Patient aware and agreeable to this

## 2019-08-20 NOTE — TELEPHONE ENCOUNTER
Pt needs to go to er to view these new blisters and ensure no infections or skin breakdown  This was a big issue in the past for her  Pt already with a sacral decub  I am concerning if not seen that this can worsen as previously did    Pt needs to have the blisters visualized to make sure appropriate care is being done and no others

## 2019-08-20 NOTE — TELEPHONE ENCOUNTER
MSW had yet to hear back from patient's RN, Tamara Paredes, so MSW placed call to [phone] and was able to connect with her  Tamara Paredes stated that she had provided the air mattress script to patient's PCP, Dr Bharti James, who was going to provide needed letter of medical necessity and fax script over to [address]  Tamara Paredes stated that Dr Bharti James was on vacation the week before her, so she will need to follow-up with his office to check the status of the air mattress referral  Tamara Paredes stated that she would updated this writer as able  Tamara Paredes confirmed that the VNA has no plans to discharge patient at this time, especially considering that she was just started on a wound vac last week so visits are now twice a week  Tamara Paredes confirmed that they are also managing her spivey catheter  Tamara Paredes agreed that patient cannot have both VNA services and outpatient PT/OT at the same time  Tamara Paredes stated that if/when patient's wounds improve she may be able to be discharged from VNA in order to receive outpatient care, but that would involve seeing a urologist for spivey catheter care  This decision would need to be up to the patient  Tamara Paredes is aware that, in the meantime, patient is agreeable to try to work with a different therapist from the VNA to see if she has different results  MSW advised that the supervisor was going to be faxing an order to this office for Dr Eve Lopez to sign, but MSW advised that this writer had not seen holger yet

## 2019-08-20 NOTE — TELEPHONE ENCOUNTER
pt called and states that she had to stop her gabapentin over the weekend  she states that she started breaking out in blisters on saturday and she contact pcp and they advised that she stop med and contact our office  blisters were to her hip and back side  She states that this also happened with lyrica in the past  she was taking 300mg 2 caps bid  she was doing well with this dose and was not taking dose in afternoon      Venlafaxine xr 150mg daily   Amitriptyline 25mg 1 tab hs  please advise  [phone]

## 2019-08-20 NOTE — TELEPHONE ENCOUNTER
Vargas, see my  full response below    Also ok with me to hold on the gabapentin as per  pcp recommendations

## 2019-08-26 NOTE — TELEPHONE ENCOUNTER
As noted below, I wanted pt to go to er 6 days ago  I would not give pain meds until this is seen  Pain meds for the blisters and decub need to come from wound care  Please have pt go to er asap    Again this was an issue for her in the past

## 2019-08-26 NOTE — TELEPHONE ENCOUNTER
Pt called in stating she needs something for pain since stopping the gabapentin, states that the pain from her sacral bedsore is bad  Pt states that her wound appt that was scheduled for today was cancelled so she thinks she may go to the ER for her blisters

## 2019-09-03 NOTE — TELEPHONE ENCOUNTER
MSW had yet to hear back from patient's VNA nurse, Jody Flores, so MSW placed call to her this date at [phone]  No answer  MSW left message requesting callback regarding air mattress and PT order  Awaiting same

## 2019-09-06 NOTE — TELEPHONE ENCOUNTER
MSW had yet to hear back from patient's VNA nurse, Summer Bhakta, so MSW placed call to her this date at [phone]  No answer  MSW left message requesting callback regarding air mattress and PT order  Awaiting same

## 2019-09-16 NOTE — TELEPHONE ENCOUNTER
MSW had yet to hear back from patient's VNA RN, so MSW placed call to patient to see if she had received the air mattress yet  Patient stated that she is currently at a skilled nursing facility, [address] [place] [address], in [place], [place]  Patient stated that she initially went to [facility] because her wound became infected  Patient stated that she was in the hospital for 5 days and that they then transferred her to [address] [place] [address] for rehab  Patient stated that with her insurance, she is approved to be there until 10/6/19, but she feels that the wound is healing nicely and that she may be discharged sooner than that  MSW advised that the staff at the facility should be able to set her up with any DME/VNA needs that she may have upon discharge  MSW will follow-up with patient in 3 weeks to ensure that she has needed DME/services  Patient stated that she had been getting blisters so she was taken off the gabapentin  Patient stated that they now have her on percocet  Patient stated that she knows that she cannot remain on Percocet  MSW advised she will need to address medication issues with the clinical team/Dr Marla Lopez

## 2019-09-18 NOTE — TELEPHONE ENCOUNTER
Let pt know it is important for the doctors who are following her in rehab help with getting pt off the percocet as not a long term solution as she is aware  It may be helpful for pt to see pain mx for other options to the gabapentin while in rehab  We cannot take over rxing of percocet as outpatient   The  and  at the rehab facility should also be able to help expedite her home needs ie air mattress, etc

## 2019-10-01 NOTE — TELEPHONE ENCOUNTER
Patient called in to report both Lyrica and Gabapentin gave patient blisters  She is asking for alternative medication to help with nerve pain  Please send to [facility] in [place]

## 2019-10-01 NOTE — TELEPHONE ENCOUNTER
No great med alternatives  rec pt to see pain management  We can make a referral for her  There is pain mx here near [place] as well at the [employer]  Referral in emr

## 2019-10-03 NOTE — TELEPHONE ENCOUNTER
Pt made aware and verbalized understanding  Pt states she is getting dc'd in the "next day or two"  I mailed referral to pt's home  Referral also forwarded to PM scheduling work queue

## 2019-10-07 NOTE — TELEPHONE ENCOUNTER
Called and spoke with patient  The pain is not from her wound, it is her long-standing neuropathy pain  Discussed Dr Claudette Aparicio wants her to see pain management and had already put the referral in  She said this AM a pain specialist came to the facility and saw her and gave her a patch that she will keep on x 7 days, does not know the name  She says he does not see patients in the outpatient setting  I told her if the patch is working, could discuss with PCP to prescribe until she sees pain management    I let her know the referral was mailed home to her and looks like nursing routed the referral to PM through [facility] so hopefully they will call her

## 2019-10-07 NOTE — TELEPHONE ENCOUNTER
MSW spoke with patient this date at [phone]  Patient stated that she is still in the rehab, but the plan is for her to be discharged tomorrow as long as he would vac comes  Patient stated that she did have an air mattress delivered yesterday, but today the mattress was flat, so her daughter called the [employer]  They will be going out tomorrow to look at the mattress  While on the phone, patient stated that she needs Dr Vikash Estes to fill out a form from [employer] certifying that she needs 24 hour care  Patient stated that Dr Vikash Estes completed a paper like this for her before  Patient reports that her daughter is there with her during the day, and that her son will be coming at night  The signing of this form will allow her son to receive money for her care  Patient stated that this form was sent to Dr Liset Giraldo office last week  MSW does not see this form scanned into patient's chart, but it may have been received and in a folder for signature  MSW did advise that this office has a 2 week turnaround time for paperwork completion and that there is a charge for it  MSW advised that once the form is received that she will be notified of the charge and this office will confirm if same can be completed  Patient did inquire what this office's fax number is and MSW provided it as [phone]  MSW also reached out to [employer] to see if they could locate this document, but MSW had to leave a message requesting callback  Patient also inquiring what she should do in the meantime before she gets referred to pain management  MSW informed patient of previous response from Dr Vikash Estes, but patient still had questions  MSW advised that patient will need to speak with a clinical team member about her inquires  MSW attempted to transfer call, but was told by a member of the Clinical Team to send the message to Dr Vikash Estes directly who would then provide direction   Patient was unsure what pain management provider she was referred to  BETO advised that patient can select the provider of her choice  MSW offered to mail patient a list of the pain management specialists in her area that are in-network with Medicare, but that she will need to contact the office of her choice to inquire if they accept her secondary insurances (as she reports changes to her coverage that this office does not yet have on file)  BETO placed the script in the mail to patient this date for pain management as well as a listing of pain management specialists within 25 miles of her zip code that accept Medicare

## 2019-10-07 NOTE — TELEPHONE ENCOUNTER
Carole Dunn, please make sure pt discusses with the team at her current facility, best med for the bed sore pain  Or have this addressed with wound care team as well  I do not feel comfortable giving her lyrica or neurontin as pt is sure the blisters are related to these meds both current and past   We have already tried a rechallenge and still with issues  Wound care pain meds must come from the wound care team   I would not recommend her leaving the snf until pain meds addressed by current team seeing her

## 2019-10-10 NOTE — TELEPHONE ENCOUNTER
Received fax today in regards to [employer]  Form is to be filled out for her son, Annalisa Smith, so he can help take care of her  [employer] stated that the form must say she needs 24 hour care  Pt said her daughter is with her all day and her son at night  Dropped $40 charge, patient paid form fee  Advised of the two week period and patient gave a verbal understanding   Form scanned into chart under media and placed in clinical bin

## 2019-11-13 PROBLEM — M79.2 NEUROPATHIC PAIN: Status: ACTIVE | Noted: 2019-01-01

## 2019-11-13 PROBLEM — G89.4 CHRONIC PAIN SYNDROME: Status: ACTIVE | Noted: 2019-01-01
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
## 2019-11-15 NOTE — TELEPHONE ENCOUNTER
[facility] called stating that baclofen is not covered by insurance       Please advise pt can be reached at [phone]

## 2019-12-03 NOTE — TELEPHONE ENCOUNTER
SW patient, states the medication is helping a little with her pain, denies any side effects        Next ovs with AS o 12/11/19

## 2019-12-03 NOTE — TELEPHONE ENCOUNTER
Pt contacted Call Center requested refill of their medication  Medication Name:  topiramate (TOPAMAX)    Dosage of Med:  25 mg tablet     Frequency of Med:  2 times a day    Remaining Medication:      Pharmacy and Location:  [facility] on file      Pt  Preferred Callback Phone Number:      Thank you

## 2019-12-03 NOTE — TELEPHONE ENCOUNTER
Called patient to confirm 12/4/2019 appointment with Ruthy Smiley at [facility] - Patient stated that she is unable to make this appointment due to her Francisco Javier Guzman breaking down  She requested to reschedule the appointment with Dr Alexander Bass in the [place] office  She refused any other location other than [place] because it is difficult for her to make it to her appointments at the other locations  Rescheduled for 4/7/2020 Dr Alexander Bass 1:30PM at [place]  Also, placed on wait list for [facility]

## 2019-12-24 PROBLEM — L89.159 SACRAL DECUBITUS ULCER: Status: ACTIVE | Noted: 2019-01-01

## 2019-12-24 PROBLEM — E87.1 HYPONATREMIA: Status: ACTIVE | Noted: 2019-01-01

## 2019-12-24 PROBLEM — N82.0 VVF (VESICOVAGINAL FISTULA): Status: ACTIVE | Noted: 2019-01-01

## 2019-12-24 NOTE — ED NOTES
Patient transferred from wheelchair to stretcher by 3 persons  Patient undressed,suprapubic catheter intact,and noted to be draining light pink urine,no leaking noted at site or in patient's attends       Lou Pleitez RN  12/24/19 2091

## 2019-12-25 PROBLEM — E87.1 HYPONATREMIA: Status: RESOLVED | Noted: 2019-01-01 | Resolved: 2019-01-01

## 2019-12-25 PROBLEM — N39.0 UTI (URINARY TRACT INFECTION): Status: ACTIVE | Noted: 2019-01-01

## 2019-12-25 PROBLEM — L98.8 FISTULA: Status: ACTIVE | Noted: 2019-01-01

## 2019-12-25 NOTE — ASSESSMENT & PLAN NOTE
Sacral decubitus present on admission  Did have wound VAC which has recently been removed and on dressing changes every other day  Have wound care evaluate

## 2019-12-25 NOTE — PHYSICAL THERAPY NOTE
PT EVALUATION     12/25/19 1045   Pain Assessment   Pain Assessment No/denies pain   Home Living   Type of Home House   Home Layout Multi-level; Able to live on main level with bedroom/bathroom; Ramped entrance   [employer] scooter  (beasy board for transfers, crank style tari lift)   Additional Comments patient bed bound requiring assist of 2 persons for transfers from bed to power chair   Prior Function   Level of [place] Needs assistance with ADLs and functional mobility   Lives With Alone   Receives Help From Family  (daughter assists daily and son stays at night)   ADL Assistance Needs assistance   IADLs Needs assistance   Comments pateint bed bound except for weekly transport to wound center with [address]   Restrictions/Precautions   Other Precautions Chair Alarm; Bed Alarm; Fall Risk   General   Additional Pertinent History chart reviewed, patient admitted with UTI, sacral decub, problem with urinary catheter  Patient with extensive medical history and now bed bound/WC bound for several years as per patient  Patient states working on increased help in home as daughter is only caregiver at this time   Family/Caregiver Present No   Cognition   Overall Cognitive Status WFL   Arousal/Participation Cooperative   Orientation Level Oriented X4   Following Commands Follows multistep commands with increased time or repetition   RLE Assessment   RLE Assessment   (ROM WFL except PF contracture, strength 2-/5)   LLE Assessment   LLE Assessment   (ROM WFL except PF contracture, strength 1/5)   Coordination   Movements are Fluid and Coordinated 0   Bed Mobility   Rolling R 2  Maximal assistance   Additional items LE management;Verbal cues   Rolling L 2  Maximal assistance   Additional items Verbal cues;LE management   Supine to Sit 1  Dependent   Sit to Supine 1  Dependent   Transfers   Sit to Stand Unable to assess   Ambulation/Elevation   Gait Assistance 1  Dependent  (nonambulatory for several years as per patient)   Activity Tolerance   Activity Tolerance Patient limited by fatigue   Nurse Made Aware yes   Assessment   Prognosis Good   Problem List Decreased strength;Decreased range of motion;Decreased endurance; Impaired balance;Decreased mobility; Decreased coordination; Impaired tone   Assessment Patient seen for Physical Therapy evaluation  Patient admitted with VVF (vesicovaginal fistula)  Comorbidities affecting patient's physical performance include: Devics disease, polyneuropthy, hypothyroidism, hyponatremia, sacral decubitus ulcer, UTI, gait dysfunciton  Personal factors affecting patient at time of initial evaluation include: lives in two story house, inability to ambulate household distances, inability to navigate community distances, inability to navigate level surfaces without external assistance, inability to perform dynamic tasks in community, limited home support, inability to perform physical activity, inability to perform ADLS and inability to perform IADLS   Prior to admission, patient was dependent for mobility, requiring assist for ADLS, requiring assist for IADLS and home with family assist   Please find objective findings from Physical Therapy assessment regarding body systems outlined above with impairments and limitations including weakness, decreased ROM, impaired balance, decreased endurance, impaired coordination, gait deviations, decreased activity tolerance, decreased functional mobility tolerance, fall risk and decreased skin integrity  The Barthel Index was used as a functional outcome tool presenting with a score of 15 today indicating marked limitations of functional mobility and ADLS  Patient's clinical presentation is currently unstable/unpredictable as seen in patient's presentation of vital sign response, changing level of pain, increased fall risk, new onset of impairment of functional mobility, decreased endurance and new onset of weakness   Pt would benefit from continued Physical Therapy treatment to address deficits as defined above and maximize level of functional mobility  As demonstrated by objective findings, the assigned level of complexity for this evaluation is high  Goals   Patient Goals get stronger and move more   STG Expiration Date 01/01/20   Short Term Goal #1 bed mobility with mod assist of 1   Short Term Goal #2 sitting endurance to 1-2 minutes, strength BLEs 2/5 all to meet patient goal of improved strength and increasing mobility   LTG Expiration Date 01/08/20   Long Term Goal #1 bed mobility with min assist of 1   Long Term Goal #2 sliding board/beasy board transfers with max assist of 1   Plan   Treatment/Interventions ADL retraining;Functional transfer training;LE strengthening/ROM; Therapeutic exercise; Endurance training;Patient/family training;Equipment eval/education; Bed mobility; Compensatory technique education   PT Frequency 5x/wk   Recommendation   Recommendation Short-term skilled PT   Barthel Index   Feeding 5   Bathing 0   Grooming Score 0   Dressing Score 5   Bladder Score 0   Bowels Score 5   Toilet Use Score 0   Transfers (Bed/Chair) Score 0   Mobility (Level Surface) Score 0   Stairs Score 0   Barthel Index Score 13   Licensure   [place] License Number  [employer] PT [id number]     PT TREATMENT  Time In:1030     Time OKP:8715  Total Time: 15min      S:  Patient cooperative and without pain at this time  O:  -BLE exercise: heel slides, hip flexion, heel cord stretching, hip abd/add all supine, AAROM/PROM 5-10 reps  A: patient will benefit from continued PT with progression as tolerated     P:  STR    Abdoul Hernandes, PT

## 2019-12-25 NOTE — PLAN OF CARE
Problem: Nutrition/Hydration-ADULT  Goal: Nutrient/Hydration intake appropriate for improving, restoring or maintaining nutritional needs  Description  Monitor and assess patient's nutrition/hydration status for malnutrition  Collaborate with interdisciplinary team and initiate plan and interventions as ordered  Monitor patient's weight and dietary intake as ordered or per policy  Utilize nutrition screening tool and intervene as necessary  Determine patient's food preferences and provide high-protein, high-caloric foods as appropriate  INTERVENTIONS:  - Monitor oral intake, urinary output, labs, and treatment plans  - Assess nutrition and hydration status and recommend course of action  - Evaluate amount of meals eaten  - Assist patient with eating if necessary   - Allow adequate time for meals  - Recommend/ encourage appropriate diets, oral nutritional supplements, and vitamin/mineral supplements  - Order, calculate, and assess calorie counts as needed  - Recommend, monitor, and adjust tube feedings and TPN/PPN based on assessed needs  - Assess need for intravenous fluids  - Provide specific nutrition/hydration education as appropriate  - Include patient/family/caregiver in decisions related to nutrition  Outcome: Progressing     Problem: Potential for Falls  Goal: Patient will remain free of falls  Description  INTERVENTIONS:  - Assess patient frequently for physical needs  -  Identify cognitive and physical deficits and behaviors that affect risk of falls    -  [place] fall precautions as indicated by assessment   - Educate patient/family on patient safety including physical limitations  - Instruct patient to call for assistance with activity based on assessment  - Modify environment to reduce risk of injury  - Consider OT/PT consult to assist with strengthening/mobility  Outcome: Progressing     Problem: PAIN - ADULT  Goal: Verbalizes/displays adequate comfort level or baseline comfort level  Description  Interventions:  - Encourage patient to monitor pain and request assistance  - Assess pain using appropriate pain scale  - Administer analgesics based on type and severity of pain and evaluate response  - Implement non-pharmacological measures as appropriate and evaluate response  - Consider cultural and social influences on pain and pain management  - Notify physician/advanced practitioner if interventions unsuccessful or patient reports new pain  Outcome: Progressing     Problem: INFECTION - ADULT  Goal: Absence or prevention of progression during hospitalization  Description  INTERVENTIONS:  - Assess and monitor for signs and symptoms of infection  - Monitor lab/diagnostic results  - Monitor all insertion sites, i e  indwelling lines, tubes, and drains  - [place] appropriate cooling/warming therapies per order  - Administer medications as ordered  - Instruct and encourage patient and family to use good hand hygiene technique  - Identify and instruct in appropriate isolation precautions for identified infection/condition   Outcome: Progressing     Problem: SAFETY ADULT  Goal: Maintain or return to baseline ADL function  Description  INTERVENTIONS:  -  Assess patient's ability to carry out ADLs; assess patient's baseline for ADL function and identify physical deficits which impact ability to perform ADLs (bathing, care of mouth/teeth, toileting, grooming, dressing, etc )  - Assess/evaluate cause of self-care deficits   - Assess range of motion  - Assess patient's mobility; develop plan if impaired  - Assess patient's need for assistive devices and provide as appropriate  - Encourage maximum independence but intervene and supervise when necessary  - Involve family in performance of ADLs  - Assess for home care needs following discharge   - Consider OT consult to assist with ADL evaluation and planning for discharge  - Provide patient education as appropriate  Outcome: Progressing  Goal: Maintain or return mobility status to optimal level  Description  INTERVENTIONS:  - Assess patient's baseline mobility status (ambulation, transfers, stairs, etc )    - Identify cognitive and physical deficits and behaviors that affect mobility  - Identify mobility aids required to assist with transfers and/or ambulation (gait belt, sit-to-stand, lift, walker, cane, etc )  - [place] fall precautions as indicated by assessment  - Record patient progress and toleration of activity level on Mobility SBAR; progress patient to next Phase/Stage  - Instruct patient to call for assistance with activity based on assessment  - Consider rehabilitation consult to assist with strengthening/weightbearing, etc   Outcome: Progressing     Problem: DISCHARGE PLANNING  Goal: Discharge to home or other facility with appropriate resources  Description  INTERVENTIONS:  - Identify barriers to discharge w/patient and caregiver  - Arrange for needed discharge resources and transportation as appropriate  - Identify discharge learning needs (meds, wound care, etc )  - Arrange for interpretive services to assist at discharge as needed  - Refer to Case Management Department for coordinating discharge planning if the patient needs post-hospital services based on physician/advanced practitioner order or complex needs related to functional status, cognitive ability, or social support system  Outcome: Progressing     Problem: Knowledge Deficit  Goal: Patient/family/caregiver demonstrates understanding of disease process, treatment plan, medications, and discharge instructions  Description  Complete learning assessment and assess knowledge base    Interventions:  - Provide teaching at level of understanding  - Provide teaching via preferred learning methods  Outcome: Progressing     Problem: GENITOURINARY - ADULT  Goal: Maintains or returns to baseline urinary function  Description  INTERVENTIONS:  - Assess urinary function  - Encourage oral fluids to ensure adequate hydration if ordered  - Administer IV fluids as ordered to ensure adequate hydration  - Administer ordered medications as needed  - Offer frequent toileting  - Follow urinary retention protocol if ordered  Outcome: Progressing  Goal: Absence of urinary retention  Description  INTERVENTIONS:  - Assess patient's ability to void and empty bladder  - Monitor I/O  - Bladder scan as needed  - Discuss with physician/AP medications to alleviate retention as needed  - Discuss catheterization for long term situations as appropriate   Outcome: Progressing  Goal: Urinary catheter remains patent  Description  INTERVENTIONS:  - Assess patency of urinary catheter  - If patient has a chronic spivey, consider changing catheter if non-functioning  *suprapubic catheter   Outcome: Progressing

## 2019-12-25 NOTE — ASSESSMENT & PLAN NOTE
History of lower extremity DVT on warfarin  INR subtherapeutic      · Holding warfarin for OR tomorrow  · Start subcutaneous heparin for prophylaxis, will restart coumadin tomorrow post op if ok with urology    Results from last 7 days   Lab Units 12/25/19  0627 12/24/19  1824   INR  1 12* 1 09

## 2019-12-25 NOTE — ASSESSMENT & PLAN NOTE
Sacral decubitus present on admission  Did have wound VAC which has recently been removed and on dressing changes every other day      Wound care consulted, recs appreciated
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
## 2019-12-25 NOTE — ASSESSMENT & PLAN NOTE
Suspected vesicovaginal fistula as patient has SPT to and today noted urine from pelvic region  Delayed contrast studies demonstrates small amount of contrast in vaginal vault  Case discussed by ED physician with urology with recommendations for admission and CT cystogram Thursday  Given ceftriaxone x1 in the emergency department; reviewed previous cultures with ESBL and pseudomonas  Patient states that she was hospitalized at [facility] for septicemia secondary to sacral osteomyelitis +/- UTI requiring meropenem for several weeks  Start zosyn and have infectious disease evaluate for further recommendations

## 2019-12-25 NOTE — QUICK NOTE
Patient seen on rounds this morning  Performed a typical review of systems  Labs, vitals and any diagnostic studies since my last visit were reviewed  We plan to do cystoscopy with bilateral retrogrades just to make sure that the leak is from the bladder and not ureters  She has a chronic Davis catheter placed suprapubically  Anything could have eroded  She will need to be done with sedation due to her inability to move  Case errantly scheduled for today  Will keep NPO and do at noon tomorrow  OR notified

## 2019-12-25 NOTE — ASSESSMENT & PLAN NOTE
Suspected vesicovaginal fistula as patient has SPTand noted urine from pelvic region  Delayed contrast studies demonstrates small amount of contrast in vaginal vault  Given ceftriaxone x1 in the emergency department; reviewed previous cultures with ESBL and pseudomonas  Patient states that she was hospitalized at [facility] for septicemia secondary to sacral osteomyelitis +/- UTI requiring meropenem for several weeks      · Changed Abx to zosyn for now  · Consulted ID, recs appreciated  · Urology consulted, plan for cystoscopy tomorrow  · NPO at MN

## 2019-12-25 NOTE — ED NOTES
Patient transported to CT via stretcher        [address] [address], RN  12/24/19 9238

## 2019-12-25 NOTE — QUICK NOTE
Called by ER physician regarding leakage of urine vaginally despite SP tube which was placed 2017  Recently changed by VNA  Also has a complicating sacral wound that is getting wet from the leakage  CT suggests there is contrast present in the vagina  Questionable if related to vesicovaginal fistula or if spontaneous leakage from her NGB that has refluxed up into the vagina  This can be determined with flexible cystoscopy on Thursday  Her pulse on arrival was 110,  Came down after fluids and is rising again  BP is borderline low  I think the combination of the fluid leak (which might need close attendance) as well as a possible UTI due to chronic SP tube or possibly new VVF, necessitates a stay in the hospital   I will be able to see her in the morning  For the evening she needs to be kept as dry as possible  If there is in fact a VVF and surgical intervention for it is risky, then nephrostomies to divert the urine would be needed

## 2019-12-25 NOTE — ASSESSMENT & PLAN NOTE
Polyneuropathy she sees pain management  Intolerant to gabapentin and lyrica after developing blisters    Continue amitriptyline and topiramate

## 2019-12-25 NOTE — ASSESSMENT & PLAN NOTE
Polyneuropathy, sees pain management  Intolerant to gabapentin and lyrica after developing blisters      Continue amitriptyline and topiramate
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
## 2019-12-25 NOTE — ASSESSMENT & PLAN NOTE
Progressive deficits disease known to Dr Mahesh Dias  On baclofen for spasticity  Has developed neurogenic bladder and bowel incontinence with subsequent SPT tube      Continue mycophenolate

## 2019-12-25 NOTE — ED NOTES
Patient transported to CT via stretcher       [address] [address], RN  12/24/19 2009

## 2019-12-25 NOTE — ASSESSMENT & PLAN NOTE
Progressive deficits disease known to Dr Nancyann Carrel  On baclofen for spasticity  Has developed neurogenic bladder and bowel incontinence with subsequent SPT tube    Continue mycophenolate

## 2019-12-25 NOTE — ASSESSMENT & PLAN NOTE
History of lower extremity DVT on warfarin  INR subtherapeutic    Start subcutaneous heparin for prophylaxis until therapeutic    Results from last 7 days   Lab Units 12/24/19  1824   INR  1 09

## 2019-12-25 NOTE — ASSESSMENT & PLAN NOTE
UA with large blood and small leukocytes  Started zosyn- taper per cx results and ID recs  Urine cx pending

## 2019-12-25 NOTE — PROGRESS NOTES
Progress Note Ric Palacios [birth year], 78 y o  female MRN: [id number]    Unit/Bed#: [address] [place] [address] Encounter: [id number]    Primary Care Provider: Aspen Chua MD   Date and time admitted to hospital: 12/24/2019  4:40 PM        * VVF (vesicovaginal fistula)  Assessment & Plan  Suspected vesicovaginal fistula as patient has SPTand noted urine from pelvic region  Delayed contrast studies demonstrates small amount of contrast in vaginal vault  Given ceftriaxone x1 in the emergency department; reviewed previous cultures with ESBL and pseudomonas  Patient states that she was hospitalized at [facility] for septicemia secondary to sacral osteomyelitis +/- UTI requiring meropenem for several weeks  · Was initially started on zosyn  · Urology consulted, s/p cysto today with no evidence of VVF  · Per urology can stop Abx, as patient will always be colonized, no further concern for infection    Abnormal urinalysis  Assessment & Plan  · UA with large blood and small leukocytes  · Patient initially Started zosyn  Per urology recs Abx stopped as patient colonized with no evidence of infection    History of DVT (deep vein thrombosis)  Assessment & Plan  History of lower extremity DVT on warfarin  INR subtherapeutic  · Restart coumadin today post op, on lovenox bridge  · Monitor INR, goal 2-3    Results from last 7 days   Lab Units 12/25/19  0627 12/24/19  1824   INR  1 12* 1 09       Pressure injury of sacral region, stage 4 (HCC)  Assessment & Plan  Did have wound VAC which has recently been removed and on dressing changes every other day  Wound care consulted, recs appreciated  P500 mattress, offloading    Hypothyroidism  Assessment & Plan  continue levothyroxine    Polyneuropathy  Assessment & Plan  Polyneuropathy, sees pain management  Intolerant to gabapentin and lyrica after developing blisters      Continue amitriptyline and topiramate    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[address] )  Assessment & Plan  Progressive deficits disease known to Dr Kriss Sibley  Patient is paraplegic  Continue baclofen for spasticity  Continue mycophenolate  Has developed neurogenic bladder and bowel incontinence with subsequent SPT tube  Supportive care          VTE Pharmacologic Prophylaxis:   Pharmacologic: Heparin  Mechanical VTE Prophylaxis in Place: Yes    Patient Centered Rounds: I have performed bedside rounds with nursing staff today  Discussions with Specialists or Other Care Team Provider: Yes  Education and Discussions with Family / Patient:Yes  Time Spent for Care: 30 minutes  More than 50% of total time spent on counseling and coordination of care as described above  Current Length of Stay: 1 day(s)  Current Patient Status: Inpatient     Discharge Plan: pending    Code Status: Level 1 - Full Code      Subjective:   Patient has had no diarrhea or leakage today  Patient was seen prior to her going for cystoscopy  Objective:     Vitals:   Temp (24hrs), Av 4 °F (36 9 °C), Min:97 3 °F (36 3 °C), Max:99 6 °F (37 6 °C)    Temp:  [97 3 °F (36 3 °C)-99 6 °F (37 6 °C)] 98 °F (36 7 °C)  HR:  [] 88  Resp:  [18-20] 18  BP: ()/(53-67) 102/54  SpO2:  [90 %-96 %] 95 %  Body mass index is 28 49 kg/m²  Input and Output Summary (last 24 hours): Intake/Output Summary (Last 24 hours) at 2019 1430  Last data filed at 2019 0736  Gross per 24 hour   Intake 1000 ml   Output 600 ml   Net 400 ml        Physical Exam:     Physical Exam   Constitutional: She is oriented to person, place, and time  She appears well-developed  No distress  HENT:   Head: Normocephalic and atraumatic  Cardiovascular: Normal rate, regular rhythm and normal heart sounds  Pulmonary/Chest: Effort normal and breath sounds normal  No respiratory distress  She has no wheezes  She has no rales  Abdominal: Soft  Bowel sounds are normal  She exhibits no distension  There is no tenderness  There is no rebound     Genitourinary:   Genitourinary Comments: Suprapubic catheter in place   Neurological: She is alert and oriented to person, place, and time  Skin: Skin is warm and dry  Psychiatric: She has a normal mood and affect  Her behavior is normal    Nursing note and vitals reviewed  Additional Data:     Labs:    Results from last 7 days   Lab Units 12/25/19 0627 12/24/19 1824   WBC Thousand/uL 5 61 9 32   HEMOGLOBIN g/dL 11 0* 12 6   HEMATOCRIT % 34 6* 39 6   PLATELETS Thousands/uL 275 309   NEUTROS PCT %  --  78*     Results from last 7 days   Lab Units 12/25/19  0627 12/24/19  1824   SODIUM mmol/L 137 130*   POTASSIUM mmol/L 3 8 5 6*   CHLORIDE mmol/L 103 99*   CO2 mmol/L 27 26   BUN mg/dL 13 16   CREATININE mg/dL 0 85 0 70   CALCIUM mg/dL 8 3 9 3   TOTAL BILIRUBIN mg/dL 0 30 0 40   ALK PHOS U/L 133* 157*   ALT U/L 8* 14   AST U/L 16 35     Results from last 7 days   Lab Units 12/25/19 0627 12/24/19 1824   INR  1 12* 1 09         No results found for: HGBA1C            * I Have Reviewed All Lab Data Listed Above  * Additional Pertinent Lab Tests Reviewed: All Labs Within Last 24 Hours Reviewed    Imaging:     CT pelvis wo contrast   Final Result by Aman Posey MD (12/24 2040)      The bladder is now opacified with contrast and a small amount of contrast is now present in the vaginal vault and introitus, confirming clinical suspicion of a vesicovaginal fistula  In order to visualize the fistulous tract, itself, a cystogram,    performed under fluoroscopy or with CT, will be necessary  I personally discussed this study with Gricelda Faulkner on 12/24/2019 at 8:40 PM                   Workstation performed: [id number]         CT abdomen pelvis with contrast   Final Result by Aman Posey MD (12/24 2008)      No gross evidence of vesicovaginal fistula  However, in order to identify or exclude a fistula, a cystogram, under fluoroscopy or with CT, should be performed    In the meantime, it may be helpful to obtain delayed CT images of the pelvis in this patient    with the hopes that contrast excreted into the bladder well opacified a fistula  I personally discussed this study with Jennifer Rodriguez on 12/24/2019 at 8:00 PM                      Workstation performed: [id number]         FL retrograde pyelogram    (Results Pending)     Imaging Reports Reviewed by myself    Cultures:   Blood Culture:   Lab Results   Component Value Date    BLOODCX No Growth After 5 Days  03/06/2016    BLOODCX No Growth After 5 Days  03/06/2016    BLOODCX No Growth After 5 Days   03/06/2016     Urine Culture:   Lab Results   Component Value Date    URINECX >100,000 cfu/ml  12/17/2019    URINECX >100,000 cfu/ml Escherichia coli ESBL (A) 06/26/2019    URINECX >100,000 cfu/ml Pseudomonas aeruginosa (A) 06/26/2019     Sputum Culture: No components found for: SPUTUMCX  Wound Culture:   Lab Results   Component Value Date    WOUNDCULT (A) 03/07/2016     1+ Growth of Methicillin Resistant Staphylococcus aureus    WOUNDCULT Few Colonies of Mixed Skin Rosy 03/07/2016    WOUNDCULT (A) 03/07/2016     1+ Growth of Methicillin Resistant Staphylococcus aureus    WOUNDCULT 3+ Growth of Diphtheroids 03/07/2016       Last 24 Hours Medication List:     Current Facility-Administered Medications:  acetaminophen 650 mg Oral Q6H PRN Shabana Concepcion, DO    amitriptyline 25 mg Oral HS Fox Fowler, DO    ammonium lactate 1 application Topical BID PRN Shabana Concepcion, DO    baclofen 5 mg Oral TID Fox Fowler, DO    benzonatate 100 mg Oral TID PRN Shabana Concepcion, DO    heparin (porcine) 5,000 Units Subcutaneous [facility] Fox Fowler, DO    levothyroxine 125 mcg Oral QAM Fox Fowler, DO    magnesium hydroxide 30 mL Oral BID PRN Shabana Concepcion, DO    mycophenolate 500 mg Oral Daily Fox Fowler, DO    ondansetron 4 mg Intravenous Q4H PRN Shabana Concepcion, DO    piperacillin-tazobactam 3 375 g Intravenous Q6H Fox Fowler, DO Last Rate: 3 375 g (12/25/19 1129)   pneumococcal 13-valent conjugate vaccine 0 5 mL Intramuscular Prior to discharge Arik Garcia,     topiramate 25 mg Oral BID Fox Fowler,     venlafaxine 150 mg Oral Daily Arik Garcia,          Today, Patient Was Seen By: Maria Isabel Gordon MD    ** Please Note: Dragon 360 Dictation voice to text software may have been used in the creation of this document   **

## 2019-12-25 NOTE — ASSESSMENT & PLAN NOTE
Hyponatremia  Continue IV fluids and recheck in a m      Results from last 7 days   Lab Units 12/24/19  1824   SODIUM mmol/L 130*

## 2019-12-26 PROBLEM — R82.90 ABNORMAL URINALYSIS: Status: ACTIVE | Noted: 2019-01-01

## 2019-12-26 PROBLEM — N31.9: Status: ACTIVE | Noted: 2017-06-30

## 2019-12-26 PROBLEM — L89.154 PRESSURE INJURY OF SACRAL REGION, STAGE 4 (HCC): Status: ACTIVE | Noted: 2019-01-01

## 2019-12-26 NOTE — QUICK NOTE
Patient seen  She has claimed no further leaking but prefers to do the evaluation today  Lungs clear and heart regular  Consent obtained

## 2019-12-26 NOTE — PLAN OF CARE
Problem: Nutrition/Hydration-ADULT  Goal: Nutrient/Hydration intake appropriate for improving, restoring or maintaining nutritional needs  Description  Monitor and assess patient's nutrition/hydration status for malnutrition  Collaborate with interdisciplinary team and initiate plan and interventions as ordered  Monitor patient's weight and dietary intake as ordered or per policy  Utilize nutrition screening tool and intervene as necessary  Determine patient's food preferences and provide high-protein, high-caloric foods as appropriate  INTERVENTIONS:  - Monitor oral intake, urinary output, labs, and treatment plans  - Assess nutrition and hydration status and recommend course of action  - Evaluate amount of meals eaten  - Assist patient with eating if necessary   - Allow adequate time for meals  - Recommend/ encourage appropriate diets, oral nutritional supplements, and vitamin/mineral supplements  - Order, calculate, and assess calorie counts as needed  - Recommend, monitor, and adjust tube feedings and TPN/PPN based on assessed needs  - Assess need for intravenous fluids  - Provide specific nutrition/hydration education as appropriate  - Include patient/family/caregiver in decisions related to nutrition  Outcome: Progressing     Problem: Potential for Falls  Goal: Patient will remain free of falls  Description  INTERVENTIONS:  - Assess patient frequently for physical needs  -  Identify cognitive and physical deficits and behaviors that affect risk of falls    -  [place] fall precautions as indicated by assessment   - Educate patient/family on patient safety including physical limitations  - Instruct patient to call for assistance with activity based on assessment  - Modify environment to reduce risk of injury  - Consider OT/PT consult to assist with strengthening/mobility  Outcome: Progressing     Problem: PAIN - ADULT  Goal: Verbalizes/displays adequate comfort level or baseline comfort level  Description  Interventions:  - Encourage patient to monitor pain and request assistance  - Assess pain using appropriate pain scale  - Administer analgesics based on type and severity of pain and evaluate response  - Implement non-pharmacological measures as appropriate and evaluate response  - Consider cultural and social influences on pain and pain management  - Notify physician/advanced practitioner if interventions unsuccessful or patient reports new pain  Outcome: Progressing     Problem: INFECTION - ADULT  Goal: Absence or prevention of progression during hospitalization  Description  INTERVENTIONS:  - Assess and monitor for signs and symptoms of infection  - Monitor lab/diagnostic results  - Monitor all insertion sites, i e  indwelling lines, tubes, and drains  - [place] appropriate cooling/warming therapies per order  - Administer medications as ordered  - Instruct and encourage patient and family to use good hand hygiene technique  - Identify and instruct in appropriate isolation precautions for identified infection/condition   Outcome: Progressing     Problem: SAFETY ADULT  Goal: Maintain or return to baseline ADL function  Description  INTERVENTIONS:  -  Assess patient's ability to carry out ADLs; assess patient's baseline for ADL function and identify physical deficits which impact ability to perform ADLs (bathing, care of mouth/teeth, toileting, grooming, dressing, etc )  - Assess/evaluate cause of self-care deficits   - Assess range of motion  - Assess patient's mobility; develop plan if impaired  - Assess patient's need for assistive devices and provide as appropriate  - Encourage maximum independence but intervene and supervise when necessary  - Involve family in performance of ADLs  - Assess for home care needs following discharge   - Consider OT consult to assist with ADL evaluation and planning for discharge  - Provide patient education as appropriate  Outcome: Progressing  Goal: Maintain or return mobility status to optimal level  Description  INTERVENTIONS:  - Assess patient's baseline mobility status (ambulation, transfers, stairs, etc )    - Identify cognitive and physical deficits and behaviors that affect mobility  - Identify mobility aids required to assist with transfers and/or ambulation (gait belt, sit-to-stand, lift, walker, cane, etc )  - [place] fall precautions as indicated by assessment  - Record patient progress and toleration of activity level on Mobility SBAR; progress patient to next Phase/Stage  - Instruct patient to call for assistance with activity based on assessment  - Consider rehabilitation consult to assist with strengthening/weightbearing, etc   Outcome: Progressing     Problem: DISCHARGE PLANNING  Goal: Discharge to home or other facility with appropriate resources  Description  INTERVENTIONS:  - Identify barriers to discharge w/patient and caregiver  - Arrange for needed discharge resources and transportation as appropriate  - Identify discharge learning needs (meds, wound care, etc )  - Arrange for interpretive services to assist at discharge as needed  - Refer to Case Management Department for coordinating discharge planning if the patient needs post-hospital services based on physician/advanced practitioner order or complex needs related to functional status, cognitive ability, or social support system  Outcome: Progressing     Problem: Knowledge Deficit  Goal: Patient/family/caregiver demonstrates understanding of disease process, treatment plan, medications, and discharge instructions  Description  Complete learning assessment and assess knowledge base    Interventions:  - Provide teaching at level of understanding  - Provide teaching via preferred learning methods  Outcome: Progressing     Problem: GENITOURINARY - ADULT  Goal: Maintains or returns to baseline urinary function  Description  INTERVENTIONS:  - Assess urinary function  - Encourage oral fluids to ensure adequate hydration if ordered  - Administer IV fluids as ordered to ensure adequate hydration  - Administer ordered medications as needed  - Offer frequent toileting  - Follow urinary retention protocol if ordered  Outcome: Progressing  Goal: Absence of urinary retention  Description  INTERVENTIONS:  - Assess patient's ability to void and empty bladder  - Monitor I/O  - Bladder scan as needed  - Discuss with physician/AP medications to alleviate retention as needed  - Discuss catheterization for long term situations as appropriate   Outcome: Progressing  Goal: Urinary catheter remains patent  Description  INTERVENTIONS:  - Assess patency of urinary catheter  - If patient has a chronic spivey, consider changing catheter if non-functioning  *suprapubic catheter   Outcome: Progressing     Problem: Prexisting or High Potential for Compromised Skin Integrity  Goal: Skin integrity is maintained or improved  Description  INTERVENTIONS:  - Identify patients at risk for skin breakdown  - Assess and monitor skin integrity  - Assess and monitor nutrition and hydration status  - Monitor labs   - Assess for incontinence   - Turn and reposition patient  - Assist with mobility/ambulation  - Relieve pressure over bony prominences  - Avoid friction and shearing  - Provide appropriate hygiene as needed including keeping skin clean and dry  - Evaluate need for skin moisturizer/barrier cream  - Collaborate with interdisciplinary team   - Patient/family teaching  - Consider wound care consult   Outcome: Progressing

## 2019-12-26 NOTE — ASSESSMENT & PLAN NOTE
· UA with large blood and small leukocytes  · Patient initially Started zosyn     · Urine cx grew Ecoli, Ab was changed to PO keflex for 3 day course

## 2019-12-26 NOTE — CONSULTS
Consult Note - Wound   Mariaelena Shock Best 78 y o  female MRN: [id number]  Unit/Bed#: OR POOL Encounter: [id number]      Assessment:   Patient is in OR for cystoscopy  Patient is paralyzed  I will order a P-500 low air loss air mattress  Plan:   1  Will see tomorrow  2  P-500 low air loss air mattress ordered  3  Discussed with ALLI Moreira  She is to follow the wound care treatment guidelines to determine appropriate wound dressing until I assess patient tomorrow  Vitals: Blood pressure 117/64, pulse 65, temperature (!) 97 4 °F (36 3 °C), resp  rate 16, height 5' 7" (1 702 m), weight 82 5 kg (181 lb 14 1 oz), SpO2 97 %  ,Body mass index is 28 49 kg/m²  Wound 03/06/16 Foot Left (Active)   Iraida-wound Assessment AMADEO 12/26/2019 12:45 PM       Incision 06/30/17 Vagina Other (Comment) (Active)   Incision Description AMADEO 12/26/2019 12:45 PM   Dressing Open to air 12/26/2019 12:45 PM       Other Ulcers 03/06/16 Other (Comment) Right (Active)   Iraida-wound Assessment AMADEO 12/26/2019 12:45 PM       Other Ulcers 03/06/16 Leg Lower; Left  (Active)   Iraida-wound Assessment AMADEO 12/26/2019 12:45 PM       Wound 12/24/19 Pressure Injury Sacrum (Active)   Wound Image   12/25/2019 12:27 AM   Wound Description AMADEO 12/26/2019 12:45 PM   Staging Stage IV 12/25/2019 11:45 PM   Iraida-wound Assessment Pink;Fragile 12/25/2019  8:54 AM   Wound Length (cm) 1 cm 12/25/2019 12:27 AM   Wound Width (cm) 2 5 cm 12/25/2019 12:27 AM   Calculated Wound Area (cm^2) 2 5 cm^2 12/25/2019 12:27 AM   Drainage Amount Scant 12/25/2019  8:54 AM   Drainage Description Yellow 12/25/2019  8:54 AM   Dressing Foam, Silicon (eg  Allevyn, etc) 12/26/2019  8:46 AM   Dressing Status Clean;Dry; Intact 12/26/2019  8:46 AM       Wound 12/25/19 Pressure Injury Heel Left (Active)   Wound Image   12/25/2019 12:22 AM   Wound Description AMADEO 12/26/2019 12:45 PM   Staging Stage I 12/25/2019 11:45 PM   Iraida-wound Assessment Dry 12/25/2019  8:54 AM   Dressing Foam, Silicon (eg   Allevyn, etc) 12/26/2019  8:46 AM       Wound 12/26/19 Incision Perineum Bilateral (Active)   Wound Description AMADEO 12/26/2019 12:45 PM

## 2019-12-26 NOTE — ASSESSMENT & PLAN NOTE
History of lower extremity DVT on warfarin  INR subtherapeutic      · Continue coumadin post op  · Monitor INR, goal 2-3    Results from last 7 days   Lab Units 12/27/19  0607 12/25/19  0627 12/24/19  1824   INR  1 19* 1 12* 1 09

## 2019-12-26 NOTE — ASSESSMENT & PLAN NOTE
Did have wound VAC which has recently been removed and on dressing changes every other day      Wound care consulted, recs appreciated  P500 mattress, offloading  Local wound care

## 2019-12-26 NOTE — ASSESSMENT & PLAN NOTE
Progressive Devics disease known to Dr Lukas Hansen  Patient is paraplegic  Continue baclofen for spasticity  Continue mycophenolate  Has developed neurogenic bladder and bowel incontinence with subsequent SPT tube      Supportive care

## 2019-12-26 NOTE — SOCIAL WORK
Met with pt again, states she would like a referral to [facility] for now  Will follow

## 2019-12-26 NOTE — ANESTHESIA PREPROCEDURE EVALUATION
Review of Systems/Medical History  Patient summary reviewed    No history of anesthetic complications     Cardiovascular  EKG reviewed, Negative cardio ROS Exercise tolerance (METS): <4,     Pulmonary    Comment: Lung cancer     GI/Hepatic  Negative GI/hepatic ROS               Endo/Other  History of thyroid disease , hypothyroidism,      GYN       Hematology  Negative hematology ROS      Musculoskeletal    Arthritis     Neurology    Spinal cord lesion,    Psychology   Anxiety, Depression ,              Physical Exam    Airway    Mallampati score: I  TM Distance: >3 FB  Neck ROM: full     Dental   lower dentures and upper dentures,     Cardiovascular  Comment: Negative ROS, Rhythm: regular, Rate: normal,     Pulmonary  Breath sounds clear to auscultation,     Other Findings        Anesthesia Plan  ASA Score- 3     Anesthesia Type- IV sedation with anesthesia with ASA Monitors  Additional Monitors:   Airway Plan:         Plan Factors-  Patient did not smoke on day of surgery  Induction- intravenous  Postoperative Plan-     Informed Consent- Anesthetic plan and risks discussed with patient  I personally reviewed this patient with the CRNA  Discussed and agreed on the Anesthesia Plan with the CRNA  Tih Olea

## 2019-12-26 NOTE — ASSESSMENT & PLAN NOTE
· Suspected vesicovaginal fistula as patient has suprapubic catheter and presented with complaints of urine-like discharge from pelvic region  · Delayed contrast studies demonstrates small amount of contrast in vaginal vault  · Given ceftriaxone x1 in the emergency department; reviewed previous cultures with ESBL and pseudomonas  Patient states that she was hospitalized at [facility] for septicemia secondary to sacral osteomyelitis +/- UTI requiring meropenem for several weeks  · Urology consulted, s/p cysto which showed no VVF  Normal left retrograde, right ureteral orifice with too much edema for identification    · Was initially started on zosyn Urine cx grew Ecoli so she was transitioned to 3 days of PO keflex  · Follow up with urology as OP

## 2019-12-26 NOTE — ANESTHESIA POSTPROCEDURE EVALUATION
Post-Op Assessment Note    CV Status:  Stable  Pain Score: 0    Pain management: adequate     Mental Status:  Alert and awake   Hydration Status:  Stable   PONV Controlled:  None   Airway Patency:  Patent   Post Op Vitals Reviewed: Yes      Staff: Anesthesiologist, CRNA           BP (P) 107/54 (12/26/19 1245)    Temp (!) (P) 97 4 °F (36 3 °C) (12/26/19 1245)    Pulse (P) 64 (12/26/19 1245)   Resp (P) 16 (12/26/19 1245)    SpO2 (P) 100 % (12/26/19 1245)

## 2019-12-26 NOTE — OP NOTE
OPERATIVE REPORT  PATIENT NAME: Boom Kim    :  [birth year]  MRN: [id number]  Pt Location: [facility] OR ROOM 04    SURGERY DATE: 2019    Surgeon(s) and Role:     * Evert Meade MD - Primary    Preop Diagnosis:  UTI (urinary tract infection) [N39 0]  Sacral decubitus ulcer [L89 159]  Neuropathic bladder [N31 9]    Post-Op Diagnosis Codes:     * UTI (urinary tract infection) [N39 0]     * Sacral decubitus ulcer [L89 159]     * Neuropathic bladder [N31 9]    Procedure(s) (LRB):  CYSTOSCOPY WITH RETROGRADE LEFT and CYSTOGRAM, PYELOGRAM, EXAM UNDER ANESTHESIA (Bilateral)    Specimen(s):  * No specimens in log *    Estimated Blood Loss:   Minimal    Drains:  Suprapubic Catheter 20 Fr  (Active)   Output (mL) 325 mL 2019  3:50 PM   Number of days: 7034       Anesthesia Type:   IV Sedation with Anesthesia    Operative Indications:  UTI (urinary tract infection) [N39 0]  Sacral decubitus ulcer [L89 159]  Neuropathic bladder [N31 9]  Abnormal CT suggesting VVF    Operative Findings:  No VVF identified  Normal Left retrograde  Right Ureteral orifice has too much edema around it from the SP tube tip to allow its identification  Complications:   none    Procedure and Technique:  After obtaining consent, patient was identified brought to the operating room  She was placed on the table in the supine position  She was given sedation and placed in lithotomy position  Her existing suprapubic was clamped to prevent egress during cystoscopy  She was sterilely prepped and draped  A time-out was performed  The rigid 22 [place] scope with 30 degree lens was placed  The bladder was systematically surveyed  The only thing that was abnormal was area around the right trigone where the tip of the Davis catheter was pushing  The left ureteral orifice was easy to see and a retrograde was performed  This was normal   A cystogram was then performed    This showed no evidence of extravasation of contrast outside of the confines of the bladder  The weighted speculum was placed in the anterior wall of the vagina was easily visualized  The bladder was filled with fluid  There was no evidence of a leak into the anterior vaginal wall  The bladder was drained and the clamp was removed from the suprapubic tube  She was awakened and transferred to the recovery room in satisfactory condition  Patient Disposition:  She could be transferred back to the [facility]  She can be discharged home when she is comfortable  She does not need prolonged antibiotics  She will always have colonization of her urine  She received at least 48 hours of IV therapy thus far  She no longer complains of leaking of urine, so whatever was occurring is no longer occurring  She might benefit from exchange of her existing catheters with double balloon catheters such as the duette by linh      SIGNATURE: Griselda Albe, MD  DATE: December 26, 2019  TIME: 12:46 PM

## 2019-12-26 NOTE — SOCIAL WORK
Pt is current LOS 2 days  GMLOS is 2 6 days  Met with pt  Resides alone in a multi-level home, but is on the first floor  Has ramp to enter  Uses a transfer board to get oob  Has electric scooter and cranked tari lift  Has been basically bed bound at home  Daughter assists pt during the day and son stays at night  Goes the [facility] care center weekly  Is open to [facility]  Has been using a home VAC  Had been to [facility] in the past   A post acute care recommendation was made by your care team for short term rehab  CM discussed the freedom of choice with patient  CM provided skilled facility list, given to patient via printed packet  Patient is aware the list is custom filtered by patient's zip code and that [facility] post acute providers are designated  Await for pt choices  Will follow with referrals  +LW  +POA  Copies on chart  Uses the [facility] pharmacy in [place]  Explained role of cm, will follow  CM reviewed d/c planning process including the following: identifying help at home, patient preference for d/c planning needs, and availability of the treatment team to discuss questions or concerns patient and/or family may have regarding understanding of medications and recognizing signs and symptoms once discharged  CM also encouraged patient to follow up with all recommended appointments after discharge  Patient advised of importance for patient and family to participate in managing patient's medical well being

## 2019-12-27 PROBLEM — N30.00 ACUTE CYSTITIS WITHOUT HEMATURIA: Status: ACTIVE | Noted: 2019-01-01

## 2019-12-27 NOTE — SOCIAL WORK
Had worked on pt's placement for STR parts of the day  [facility] unable to accept due to pt's contact precautions  Pt was agreeable to [address] [place] [address], but they too did not have a private room for contact precautions  Pt willing for referral to [address] [place] [address]  Made referral, were able to accept  Per pt, wants to return home, but unsure  Pt provided her [employer] card, emailed copy to [email or web address] [facility] and to Chong Perry in [employer] to update  Pt is on the [address] [place] [address] Rd program   Plan is for transfer to [facility] today

## 2019-12-27 NOTE — DISCHARGE SUMMARY
Discharge- Boris Houser [birth year], 78 y o  female MRN: [id number]    Unit/Bed#: [address] [place] [address] Encounter: [id number]    Primary Care Provider: Karthik Diamond MD   Date and time admitted to hospital: 12/24/2019  4:40 PM        * VVF (vesicovaginal fistula)  Assessment & Plan  · Suspected vesicovaginal fistula as patient has suprapubic catheter and presented with complaints of urine-like discharge from pelvic region  · Delayed contrast studies demonstrates small amount of contrast in vaginal vault  · Given ceftriaxone x1 in the emergency department; reviewed previous cultures with ESBL and pseudomonas  Patient states that she was hospitalized at [facility] for septicemia secondary to sacral osteomyelitis +/- UTI requiring meropenem for several weeks  · Urology consulted, s/p cysto which showed no VVF  Normal left retrograde, right ureteral orifice with too much edema for identification  · Was initially started on zosyn Urine cx grew Ecoli so she was transitioned to 3 days of PO keflex  · Follow up with urology as OP    Acute cystitis without hematuria  Assessment & Plan  · UA with large blood and small leukocytes  · Patient initially Started zosyn  · Urine cx grew Ecoli, Ab was changed to PO keflex for 3 day course    History of DVT (deep vein thrombosis)  Assessment & Plan  History of lower extremity DVT on warfarin  INR subtherapeutic  · Continue coumadin post op  · Monitor INR, goal 2-3    Results from last 7 days   Lab Units 12/27/19  0607 12/25/19  0627 12/24/19  1824   INR  1 19* 1 12* 1 09       Hyponatremiaresolved as of 12/25/2019  Assessment & Plan  Hyponatremia on admission  Resolved with IVF hydration overnight    Pressure injury of sacral region, stage 4 ([address] )  Assessment & Plan  Did have wound VAC which has recently been removed and on dressing changes every other day      Wound care consulted, recs appreciated  P500 mattress, offloading  Local wound care    Hypothyroidism  Assessment & Plan  continue levothyroxine    Polyneuropathy  Assessment & Plan  Polyneuropathy, sees pain management  Intolerant to gabapentin and lyrica after developing blisters  Continue amitriptyline and topiramate    Devic's disease 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  Patient is paraplegic  Continue baclofen for spasticity  Continue mycophenolate  Has developed neurogenic bladder and bowel incontinence with subsequent SPT tube      Supportive care          Discharging Physician / Practitioner: Shady Love MD  PCP: Vasyl Ricks MD  Admission Date: 12/24/2019  Discharge Date: 12/27/19    Reason for Admission: Urinary Catheter Problem (had supra pubic catheter changed, woke soaked, rechanged today, still leaking, concerned as she has a decubitus ulcer afraid it will get infected)        Resolved Problems  Date Reviewed: 12/24/2019          Resolved    Hyponatremia 12/25/2019     Resolved by  Shady Love MD          Consultations During Hospital Stay:  IP CONSULT TO WOUND CARE  IP CONSULT TO UROLOGY  IP CONSULT TO CASE MANAGEMENT    Procedures Performed:     · Cystoscopy: results as noted above    Significant Findings / Test Results:     ·   Results from last 7 days   Lab Units 12/26/19  0822 12/25/19  0627 12/24/19  1824   WBC Thousand/uL 5 47 5 61 9 32   HEMOGLOBIN g/dL 10 7* 11 0* 12 6   PLATELETS Thousands/uL 270 275 309     Results from last 7 days   Lab Units 12/26/19  0822 12/25/19  0627 12/24/19  1824   SODIUM mmol/L 139 137 130*   POTASSIUM mmol/L 4 1 3 8 5 6*   CHLORIDE mmol/L 106 103 99*   CO2 mmol/L 27 27 26   BUN mg/dL 15 13 16   CREATININE mg/dL 0 93 0 85 0 70   CALCIUM mg/dL 8 5 8 3 9 3   TOTAL BILIRUBIN mg/dL  --  0 30 0 40   ALK PHOS U/L  --  133* 157*   ALT U/L  --  8* 14   AST U/L  --  16 35     Results from last 7 days   Lab Units 12/27/19  0607 12/25/19  0627 12/24/19  1824   INR  1 19* 1 12* 1 09         No results found for: HGBA1C          Blood Culture: Lab Results   Component Value Date    BLOODCX No Growth After 5 Days  03/06/2016    BLOODCX No Growth After 5 Days  03/06/2016    BLOODCX No Growth After 5 Days  03/06/2016     Urine Culture:   Lab Results   Component Value Date    URINECX 30,000-39,000 cfu/ml Escherichia coli (A) 12/24/2019    URINECX >100,000 cfu/ml  12/17/2019    URINECX >100,000 cfu/ml Escherichia coli ESBL (A) 06/26/2019    URINECX >100,000 cfu/ml Pseudomonas aeruginosa (A) 06/26/2019     Sputum Culture: No components found for: SPUTUMCX  Wound Culture:   Lab Results   Component Value Date    WOUNDCULT (A) 03/07/2016     1+ Growth of Methicillin Resistant Staphylococcus aureus    WOUNDCULT Few Colonies of Mixed Skin Rosy 03/07/2016    WOUNDCULT (A) 03/07/2016     1+ Growth of Methicillin Resistant Staphylococcus aureus    WOUNDCULT 3+ Growth of Diphtheroids 03/07/2016        FL retrograde pyelogram   Final Result by Juventino Rodriguez MD (12/26 1517)      Fluoroscopic guidance provided for left retrograde pyelogram  Please see procedure report for further details  Workstation performed: [id number]         CT pelvis wo contrast   Final Result by Waqar Dyer MD (12/24 2040)      The bladder is now opacified with contrast and a small amount of contrast is now present in the vaginal vault and introitus, confirming clinical suspicion of a vesicovaginal fistula  In order to visualize the fistulous tract, itself, a cystogram,    performed under fluoroscopy or with CT, will be necessary  I personally discussed this study with Florencio Brody on 12/24/2019 at 8:40 PM                   Workstation performed: [id number]         CT abdomen pelvis with contrast   Final Result by Waqar Dyer MD (12/24 2008)      No gross evidence of vesicovaginal fistula  However, in order to identify or exclude a fistula, a cystogram, under fluoroscopy or with CT, should be performed    In the meantime, it may be helpful to obtain delayed CT images of the pelvis in this patient    with the hopes that contrast excreted into the bladder well opacified a fistula  I personally discussed this study with Larry Farias on 12/24/2019 at 8:00 PM                      Workstation performed: [id number]                Incidental Findings:   ·      Test Results Pending at Discharge (will require follow up):   ·      Outpatient Tests Requested:  ·     Complications:  none    Reason for Admission:   Chief Complaint   Patient presents with    Urinary Catheter Problem     had supra pubic catheter changed, woke soaked, rechanged today, still leaking, concerned as she has a decubitus ulcer afraid it will get infected       Hospital Course:     Per HPI: Allison Stein is a 78 y o  female patient who presents with drainage of urine from pelvic region  The patient has longstanding history of Devic's disease/neuromyelitis optica with progressive muscular weakness can subsequent loss of bowel and bladder function  She is status post SPT and had tube changed last week by visiting nurse  Sample was sent which appear to be mixed contaminants  Today the patient noted that there was drainage from her pelvic region and due to sacral decubitus she became concerned of contamination/infection  She called [facility] and a different nurse came to change her tube  After flushing she continued to have drainage from pelvic region which prompted her to come to the emergency department where there was high suspicion for vesicovaginal fistula prompting admission  Patient said that she had prolonged hospitalization and subsequent rehab stay recently at [facility] for septicemia/bacteremia requiring meropenem  Hospital Course:    Please see above list of diagnoses and related plan for additional information  Condition at Discharge: stable       Discharge Day Visit / Exam:     Subjective:  Feels good  Is no longer having any drainage  No new complaints  Has no objection to DC  Vitals: Blood Pressure: 107/51 (12/27/19 0825)  Pulse: 80 (12/27/19 0825)  Temperature: 97 9 °F (36 6 °C) (12/27/19 0825)  Temp Source: Tympanic (12/27/19 0825)  Respirations: 16 (12/27/19 0825)  Height: 5' 7" (170 2 cm) (12/24/19 2226)  Weight - Scale: 82 5 kg (181 lb 14 1 oz) (12/24/19 2226)  SpO2: 93 % (12/27/19 0825)  Exam:   Physical Exam   Constitutional: She is oriented to person, place, and time  She appears well-developed  No distress  HENT:   Head: Normocephalic and atraumatic  Cardiovascular: Normal rate, regular rhythm and normal heart sounds  Pulmonary/Chest: Effort normal and breath sounds normal  No respiratory distress  She has no wheezes  She has no rales  Abdominal: Soft  Bowel sounds are normal  She exhibits no distension  There is no tenderness  There is no rebound  Genitourinary:   Genitourinary Comments: Suprapubic catheter   Musculoskeletal:   paraplegia   Neurological: She is alert and oriented to person, place, and time  Skin: Skin is warm and dry  Psychiatric: She has a normal mood and affect  Her behavior is normal    Nursing note and vitals reviewed  Discharge instructions/Information to patient and family:   See after visit summary for information provided to patient and family  Provisions for Follow-Up Care:  See after visit summary for information related to follow-up care and any pertinent home health orders  Disposition:     Acute Rehab at [facility] Readmission: no     Discharge Statement:  I spent >30 minutes discharging the patient  This time was spent on the day of discharge  I had direct contact with the patient on the day of discharge  Greater than 50% of the total time was spent examining patient, answering all patient questions, arranging and discussing plan of care with patient as well as directly providing post-discharge instructions  Additional time then spent on discharge activities      Discharge Medications:  See after visit summary for reconciled discharge medications provided to patient and family        ** Please Note: This note has been constructed using a voice recognition system **

## 2019-12-27 NOTE — PLAN OF CARE
Problem: Nutrition/Hydration-ADULT  Goal: Nutrient/Hydration intake appropriate for improving, restoring or maintaining nutritional needs  Description  Monitor and assess patient's nutrition/hydration status for malnutrition  Collaborate with interdisciplinary team and initiate plan and interventions as ordered  Monitor patient's weight and dietary intake as ordered or per policy  Utilize nutrition screening tool and intervene as necessary  Determine patient's food preferences and provide high-protein, high-caloric foods as appropriate  INTERVENTIONS:  - Monitor oral intake, urinary output, labs, and treatment plans  - Assess nutrition and hydration status and recommend course of action  - Evaluate amount of meals eaten  - Assist patient with eating if necessary   - Allow adequate time for meals  - Recommend/ encourage appropriate diets, oral nutritional supplements, and vitamin/mineral supplements  - Order, calculate, and assess calorie counts as needed  - Assess need for intravenous fluids  - Provide specific nutrition/hydration education as appropriate  - Include patient/family/caregiver in decisions related to nutrition   Outcome: Progressing     Problem: Potential for Falls  Goal: Patient will remain free of falls  Description  INTERVENTIONS:  - Assess patient frequently for physical needs  -  Identify cognitive and physical deficits and behaviors that affect risk of falls    -  [place] fall precautions as indicated by assessment   - Educate patient/family on patient safety including physical limitations  - Instruct patient to call for assistance with activity based on assessment  - Modify environment to reduce risk of injury  - Consider OT/PT consult to assist with strengthening/mobility  Outcome: Progressing     Problem: PAIN - ADULT  Goal: Verbalizes/displays adequate comfort level or baseline comfort level  Description  Interventions:  - Encourage patient to monitor pain and request assistance  - Assess pain using appropriate pain scale  - Administer analgesics based on type and severity of pain and evaluate response  - Implement non-pharmacological measures as appropriate and evaluate response  - Consider cultural and social influences on pain and pain management  - Notify physician/advanced practitioner if interventions unsuccessful or patient reports new pain  Outcome: Progressing     Problem: INFECTION - ADULT  Goal: Absence or prevention of progression during hospitalization  Description  INTERVENTIONS:  - Assess and monitor for signs and symptoms of infection  - Monitor lab/diagnostic results  - Monitor all insertion sites, i e  indwelling lines, tubes, and drains  - [place] appropriate cooling/warming therapies per order  - Administer medications as ordered  - Instruct and encourage patient and family to use good hand hygiene technique  - Identify and instruct in appropriate isolation precautions for identified infection/condition   Outcome: Progressing     Problem: SAFETY ADULT  Goal: Maintain or return to baseline ADL function  Description  INTERVENTIONS:  -  Assess patient's ability to carry out ADLs; assess patient's baseline for ADL function and identify physical deficits which impact ability to perform ADLs (bathing, care of mouth/teeth, toileting, grooming, dressing, etc )  - Assess/evaluate cause of self-care deficits   - Assess range of motion  - Assess patient's mobility; develop plan if impaired  - Assess patient's need for assistive devices and provide as appropriate  - Encourage maximum independence but intervene and supervise when necessary  - Involve family in performance of ADLs  - Assess for home care needs following discharge   - Consider OT consult to assist with ADL evaluation and planning for discharge  - Provide patient education as appropriate  Outcome: Progressing  Goal: Maintain or return mobility status to optimal level  Description  INTERVENTIONS:  - Assess patient's baseline mobility status (ambulation, transfers, stairs, etc )    - Identify cognitive and physical deficits and behaviors that affect mobility  - Identify mobility aids required to assist with transfers and/or ambulation (gait belt, sit-to-stand, lift, walker, cane, etc )  - [place] fall precautions as indicated by assessment  - Record patient progress and toleration of activity level on Mobility SBAR; progress patient to next Phase/Stage  - Instruct patient to call for assistance with activity based on assessment  - Consider rehabilitation consult to assist with strengthening/weightbearing, etc   Outcome: Progressing     Problem: DISCHARGE PLANNING  Goal: Discharge to home or other facility with appropriate resources  Description  INTERVENTIONS:  - Identify barriers to discharge w/patient and caregiver  - Arrange for needed discharge resources and transportation as appropriate  - Identify discharge learning needs (meds, wound care, etc )  - Arrange for interpretive services to assist at discharge as needed  - Refer to Case Management Department for coordinating discharge planning if the patient needs post-hospital services based on physician/advanced practitioner order or complex needs related to functional status, cognitive ability, or social support system  Outcome: Progressing     Problem: Knowledge Deficit  Goal: Patient/family/caregiver demonstrates understanding of disease process, treatment plan, medications, and discharge instructions  Description  Complete learning assessment and assess knowledge base    Interventions:  - Provide teaching at level of understanding  - Provide teaching via preferred learning methods  Outcome: Progressing     Problem: GENITOURINARY - ADULT  Goal: Maintains or returns to baseline urinary function  Description  INTERVENTIONS:  - Assess urinary function  - Encourage oral fluids to ensure adequate hydration if ordered  - Administer IV fluids as ordered to ensure adequate hydration  - Administer ordered medications as needed  - Offer frequent toileting  - Follow urinary retention protocol if ordered  Outcome: Progressing  Goal: Absence of urinary retention  Description  INTERVENTIONS:  - Assess patient's ability to void and empty bladder  - Monitor I/O  - Bladder scan as needed  - Discuss with physician/AP medications to alleviate retention as needed  - Discuss catheterization for long term situations as appropriate   Outcome: Progressing  Goal: Urinary catheter remains patent  Description  INTERVENTIONS:  - Assess patency of urinary catheter  - If patient has a chronic spivey, consider changing catheter if non-functioning  *suprapubic catheter   Outcome: Progressing     Problem: Prexisting or High Potential for Compromised Skin Integrity  Goal: Skin integrity is maintained or improved  Description  INTERVENTIONS:  - Identify patients at risk for skin breakdown  - Assess and monitor skin integrity  - Assess and monitor nutrition and hydration status  - Monitor labs   - Assess for incontinence   - Turn and reposition patient  - Assist with mobility/ambulation  - Relieve pressure over bony prominences  - Avoid friction and shearing  - Provide appropriate hygiene as needed including keeping skin clean and dry  - Evaluate need for skin moisturizer/barrier cream  - Collaborate with interdisciplinary team   - Patient/family teaching  - Consider wound care consult   Outcome: Progressing

## 2019-12-27 NOTE — TRANSPORTATION MEDICAL NECESSITY
Section I - General Information    Name of Patient: Ghazal Kelly                 :     Medicare #: [id number]  Transport Date: 19 (PCS is valid for round trips on this date and for all repetitive trips in the 60-day range as noted below )  Origin: [address] [facility] [address]  [address]:  [facility] [address]  Is the pt's stay covered under Medicare Part A (PPS/DRG)   [x]     Closest appropriate facility? If no, why is transport to more distant facility required? Yes  If hospice pt, is this transport related to pt's terminal illness? N/a          Section II - Medical Necessity Questionnaire  Ambulance transportation is medically necessary only if other means of transport are contraindicated or would be potentially harmful to the patient  To meet this requirement, the patient must either be "bed confined" or suffer from a condition such that transport by means other than ambulance is contraindicated by the patient's condition  The following questions must be answered by the medical professional signing below for this form to be valid:    1)  Describe the MEDICAL CONDITION (physical and/or mental) of this patient AT [address] [place] [address] that requires the patient to be transported in an ambulance and why transport by other means is contraindicated by the patient's condition: multiple pressure ulcers, Devics dis, neuropathy, polyneuropathy, bedbound  2) Is the patient "bed confined" as defined below? Yes  To be "be confined" the patient must satisfy all three of the following conditions: (1) unable to get up from bed without Assistance; AND (2) unable to ambulate; AND (3) unable to sit in a chair or wheelchair  3) Can this patient safely be transported by car or wheelchair van (i e , seated during transport without a medical attendant or monitoring)?    No    4) In addition to completing questions 1-3 above, please check any of the following conditions that apply*:   *Note: supporting documentation for any boxes checked must be maintained in the patient's medical records  If hosp-hosp transfer, describe services needed at 2nd facility not available at 1st facility? Moderate/severe pain on movement   Medical attendant required   Unable to tolerate seated position for time needed to transport   Unable to sit in a chair or wheelchair due to decubitus ulcers or other wounds       Section III - Signature of Physician or Healthcare Professional  I certify that the above information is true and correct based on my evaluation of this patient, and represent that the patient requires transport by ambulance and that other forms of transport are contraindicated  I understand that this information will be used by the Centers for Medicare and Medicaid Services (CMS) to support the determination of medical necessity for ambulance services, and I represent that I have personal knowledge of the patient's condition at time of transport  []  If this box is checked, I also certify that the patient is physically or mentally incapable of signing the ambulance service's claim and that the institution with which I am affiliated has furnished care, services, or assistance to the patient  My signature below is made on behalf of the patient pursuant to 42 CFR §424 36(b)(4)   In accordance with 42 CFR §424 37, the specific reason(s) that the patient is physically or mentally incapable of signing the claim form is as follows:       Signature of Physician* or Healthcare Professional___Olivia James___________________________________________________________  Signature Date 12/27/19 (For scheduled repetitive transports, this form is not valid for transports performed more than 60 days after this date)    Printed Name & Credentials of Physician or Healthcare Professional (MD, , RN, etc )________________________________  *Form must be signed by patient's attending physician for scheduled, repetitive transports   For non-repetitive, unscheduled ambulance transports, if unable to obtain the signature of the attending physician, any of the following may sign (choose appropriate option below)  [] Physician Assistant []  Clinical Nurse Specialist [x]  Registered Nurse  []  Nurse Practitioner  [x] Discharge Planner

## 2019-12-27 NOTE — NURSING NOTE
Patient discharged in stable condition with Javon DAVIS  AVS reviewed with receiving nurse at [facility] [place]

## 2019-12-27 NOTE — [place] UNIVERSAL TRANSFER FORM
[place] UNIV[place] TRAN[place]ER FORM  (ALL ITEMS MUST BE COMPLETED)    1  TRANSFER FROM: [address]      TRANSFER TO: [address]    2  DATE OF TRANSFER: 12/27/2019                        TIME OF TRANSFER: TBD    3  PATIENT NAME: NANCIE Oliver      YOB: 1940                             GENDER: female    4  LANGUAGE:   English    5  PHYSICIAN NAME:  Guadalupe Vu MD                   PHONE: [phone]    6  CODE STATUS: Level 1 - Full Code        Out of Hospital DNR Attached: No    7  :                                      :  Extended Emergency Contact Information  Primary Emergency Contact: Jose De Leon [address] Phone: [phone]  Relation: Daughter  Secondary Emergency Contact: Annie Liz  [place] Phone: [phone]  Mobile Phone: [phone]  Relation: [address] Representative/Proxy:  No           Legal Guardian:  No             NAME OF:           HEALTH CARE REPRESENTATIVE/PROXY:                                         OR           LEGAL GUARDIAN, IF NOT :                                               PHONE:  (Day)           (Night)                        (Cell)    8  REASON FOR TRANSFER: (Must include brief medical history and recent changes in physical function or cognition ) Medically stable for rehab            V/S: /51 (BP Location: Right arm)   Pulse 80   Temp 97 9 °F (36 6 °C) (Tympanic)   Resp 16   Ht 5' 7" (1 702 m)   Wt 82 5 kg (181 lb 14 1 oz)   SpO2 93%   BMI 28 49 kg/m²           PAIN: None    9  PRIMARY DIAGNOSIS: VVF (vesicovaginal fistula)      Secondary Diagnosis:         Pacemaker: No      Internal Defib: No          Mental Health Diagnosis (if Applicable):    10  RESTRAINTS: No     11  RESPIRATORY NEEDS: None    12  ISOLATION/PRECAUTION: ESBL    13  ALLERGY: Rituxan [rituximab]; Ultram [tramadol];  Baclofen; Ciprofibrate; Ciprofloxacin; Gabapentin; Hydromorphone; Lyrica [pregabalin]; Talwin [pentazocine]; Dilaudid [hydromorphone hcl]; and Penicillins    14  SENSORY:       Hearing Poor    15  SKIN CONDITION: Yes:  Stage (Pressure) III on sacrum (accquired prior to hospital admission)    16  DIET: Regular    17  IV ACCESS: None    18  PERSONAL ITEMS SENT WITH PATIENT: Hearing Aid Left and Right and Dentures Lower Full    19  ATTACHED DOCUMENTS: MUST ATTACH CURRENT MEDICATION INFORMATION Face Sheet, MAR, Medication Reconciliation and Discharge Summary    20  AT RISK ALERTS:Falls and Pressure Ulcer        HARM TO: N/A    21  WEIGHT BEARING STATUS:         Left Leg: None        Right Leg: None    22  MENTAL STATUS:Alert and Oriented    23  FUNCTION:        Walk: Not Able        Transfer: With Help        Toilet: With Help        Feed: Self    24  IMMUNIZATIONS/SCREENING:     Immunization History   Administered Date(s) Administered    Pneumococcal Conjugate 13-Valent 12/27/2019       25  BOWEL: Continent    26  BLADDER: Commentssuprapubic catheter    27   SENDING FACILITY CONTACT: Lesly Webb                  Title: RN BSN        Unit: 4N         Phone: [phone] [address] (if known):        Title:        Unit:         Phone:         FORM PREFILLED BY (if applicable)       Title:       Unit:        Phone:         FORM COMPLETED BY Lesly Webb RN      Title: RN BSN      Phone: [phone]

## 2019-12-31 NOTE — PROGRESS NOTES
[facility]  Email with bundle communication tool sent to  Peter Lazo with update on new bundle, DRG, and ELOS

## 2020-01-01 ENCOUNTER — PATIENT OUTREACH (OUTPATIENT)
Dept: CASE MANAGEMENT | Facility: OTHER | Age: 80
End: 2020-01-01

## 2020-01-01 ENCOUNTER — HOSPITAL ENCOUNTER (OUTPATIENT)
Dept: RADIOLOGY | Facility: HOSPITAL | Age: 80
Discharge: HOME | DRG: 189 | End: 2020-01-14
Payer: MEDICARE

## 2020-01-01 ENCOUNTER — APPOINTMENT (EMERGENCY)
Dept: RADIOLOGY | Facility: HOSPITAL | Age: 80
DRG: 189 | End: 2020-01-01
Payer: MEDICARE

## 2020-01-01 ENCOUNTER — TRANSCRIBE ORDERS (OUTPATIENT)
Dept: ADMINISTRATIVE | Facility: HOSPITAL | Age: 80
End: 2020-01-01

## 2020-01-01 ENCOUNTER — HOSPITAL ENCOUNTER (INPATIENT)
Facility: HOSPITAL | Age: 80
LOS: 1 days | DRG: 189 | End: 2020-01-17
Attending: EMERGENCY MEDICINE | Admitting: ANESTHESIOLOGY
Payer: MEDICARE

## 2020-01-01 ENCOUNTER — HOSPITAL ENCOUNTER (OUTPATIENT)
Facility: HOSPITAL | Age: 80
End: 2020-01-17
Attending: FAMILY MEDICINE | Admitting: FAMILY MEDICINE
Payer: MEDICARE

## 2020-01-01 VITALS
OXYGEN SATURATION: 54 % | TEMPERATURE: 97.8 F | BODY MASS INDEX: 30.4 KG/M2 | HEIGHT: 66 IN | WEIGHT: 189.15 LBS | DIASTOLIC BLOOD PRESSURE: 49 MMHG | RESPIRATION RATE: 32 BRPM | HEART RATE: 108 BPM | SYSTOLIC BLOOD PRESSURE: 96 MMHG

## 2020-01-01 VITALS
TEMPERATURE: 96.9 F | HEIGHT: 66 IN | RESPIRATION RATE: 39 BRPM | WEIGHT: 187.83 LBS | HEART RATE: 76 BPM | DIASTOLIC BLOOD PRESSURE: 26 MMHG | BODY MASS INDEX: 30.19 KG/M2 | OXYGEN SATURATION: 22 % | SYSTOLIC BLOOD PRESSURE: 62 MMHG

## 2020-01-01 DIAGNOSIS — A41.9 SEPSIS (HCC): Primary | ICD-10-CM

## 2020-01-01 DIAGNOSIS — R06.02 SOB (SHORTNESS OF BREATH): ICD-10-CM

## 2020-01-01 DIAGNOSIS — Z22.39 ESBL E. COLI CARRIER: ICD-10-CM

## 2020-01-01 DIAGNOSIS — R09.02 HYPOXIA: ICD-10-CM

## 2020-01-01 DIAGNOSIS — R09.89 CHEST CONGESTION: ICD-10-CM

## 2020-01-01 DIAGNOSIS — R09.89 CHEST CONGESTION: Primary | ICD-10-CM

## 2020-01-01 DIAGNOSIS — R06.00 DYSPNEA: ICD-10-CM

## 2020-01-01 DIAGNOSIS — N39.0 UTI (URINARY TRACT INFECTION): ICD-10-CM

## 2020-01-01 DIAGNOSIS — J18.9 PNEUMONIA: ICD-10-CM

## 2020-01-01 LAB
ALBUMIN SERPL BCP-MCNC: 1.7 G/DL (ref 3.5–5)
ALP SERPL-CCNC: 130 U/L (ref 46–116)
ALT SERPL W P-5'-P-CCNC: 14 U/L (ref 12–78)
ANION GAP SERPL CALCULATED.3IONS-SCNC: 11 MMOL/L (ref 4–13)
ANION GAP SERPL CALCULATED.3IONS-SCNC: 12 MMOL/L (ref 4–13)
APTT PPP: 85 SECONDS (ref 23–37)
AST SERPL W P-5'-P-CCNC: 26 U/L (ref 5–45)
ATRIAL RATE: 104 BPM
BACTERIA UR QL AUTO: ABNORMAL /HPF
BASOPHILS # BLD MANUAL: 0 THOUSAND/UL (ref 0–0.1)
BASOPHILS # BLD MANUAL: 0 THOUSAND/UL (ref 0–0.1)
BASOPHILS NFR MAR MANUAL: 0 % (ref 0–1)
BASOPHILS NFR MAR MANUAL: 0 % (ref 0–1)
BILIRUB SERPL-MCNC: 0.4 MG/DL (ref 0.2–1)
BILIRUB UR QL STRIP: NEGATIVE
BUN SERPL-MCNC: 16 MG/DL (ref 5–25)
BUN SERPL-MCNC: 19 MG/DL (ref 5–25)
CALCIUM SERPL-MCNC: 9.1 MG/DL (ref 8.3–10.1)
CALCIUM SERPL-MCNC: 9.3 MG/DL (ref 8.3–10.1)
CHLORIDE SERPL-SCNC: 103 MMOL/L (ref 100–108)
CHLORIDE SERPL-SCNC: 99 MMOL/L (ref 100–108)
CLARITY UR: ABNORMAL
CO2 SERPL-SCNC: 22 MMOL/L (ref 21–32)
CO2 SERPL-SCNC: 23 MMOL/L (ref 21–32)
COLOR UR: YELLOW
CREAT SERPL-MCNC: 0.63 MG/DL (ref 0.6–1.3)
CREAT SERPL-MCNC: 0.7 MG/DL (ref 0.6–1.3)
EOSINOPHIL # BLD MANUAL: 0 THOUSAND/UL (ref 0–0.4)
EOSINOPHIL # BLD MANUAL: 0 THOUSAND/UL (ref 0–0.4)
EOSINOPHIL NFR BLD MANUAL: 0 % (ref 0–6)
EOSINOPHIL NFR BLD MANUAL: 0 % (ref 0–6)
ERYTHROCYTE [DISTWIDTH] IN BLOOD BY AUTOMATED COUNT: 14.8 % (ref 11.6–15.1)
ERYTHROCYTE [DISTWIDTH] IN BLOOD BY AUTOMATED COUNT: 15 % (ref 11.6–15.1)
FINE GRAN CASTS URNS QL MICRO: ABNORMAL /LPF
FLUAV RNA NPH QL NAA+PROBE: NORMAL
FLUBV RNA NPH QL NAA+PROBE: NORMAL
GFR SERPL CREATININE-BSD FRML MDRD: 83 ML/MIN/1.73SQ M
GFR SERPL CREATININE-BSD FRML MDRD: 86 ML/MIN/1.73SQ M
GLUCOSE SERPL-MCNC: 107 MG/DL (ref 65–140)
GLUCOSE SERPL-MCNC: 83 MG/DL (ref 65–140)
GLUCOSE UR STRIP-MCNC: NEGATIVE MG/DL
HCT VFR BLD AUTO: 31.5 % (ref 34.8–46.1)
HCT VFR BLD AUTO: 33.5 % (ref 34.8–46.1)
HGB BLD-MCNC: 10.3 G/DL (ref 11.5–15.4)
HGB BLD-MCNC: 10.8 G/DL (ref 11.5–15.4)
HGB UR QL STRIP.AUTO: ABNORMAL
INR PPP: 5.54 (ref 0.91–1.09)
INR PPP: 7.61 (ref 0.91–1.09)
KETONES UR STRIP-MCNC: ABNORMAL MG/DL
L PNEUMO1 AG UR QL IA.RAPID: NEGATIVE
LACTATE SERPL-SCNC: 1 MMOL/L (ref 0.5–2)
LEUKOCYTE ESTERASE UR QL STRIP: NEGATIVE
LG PLATELETS BLD QL SMEAR: PRESENT
LG PLATELETS BLD QL SMEAR: PRESENT
LYMPHOCYTES # BLD AUTO: 0.31 THOUSAND/UL (ref 0.6–4.47)
LYMPHOCYTES # BLD AUTO: 0.39 THOUSAND/UL (ref 0.6–4.47)
LYMPHOCYTES # BLD AUTO: 1 % (ref 14–44)
LYMPHOCYTES # BLD AUTO: 2 % (ref 14–44)
MACROCYTES BLD QL AUTO: PRESENT
MACROCYTES BLD QL AUTO: PRESENT
MAGNESIUM SERPL-MCNC: 1.7 MG/DL (ref 1.6–2.6)
MAGNESIUM SERPL-MCNC: 2 MG/DL (ref 1.6–2.6)
MCH RBC QN AUTO: 32.6 PG (ref 26.8–34.3)
MCH RBC QN AUTO: 32.7 PG (ref 26.8–34.3)
MCHC RBC AUTO-ENTMCNC: 32.2 G/DL (ref 31.4–37.4)
MCHC RBC AUTO-ENTMCNC: 32.7 G/DL (ref 31.4–37.4)
MCV RBC AUTO: 100 FL (ref 82–98)
MCV RBC AUTO: 101 FL (ref 82–98)
METAMYELOCYTES NFR BLD MANUAL: 1 % (ref 0–1)
MONOCYTES # BLD AUTO: 0.62 THOUSAND/UL (ref 0–1.22)
MONOCYTES # BLD AUTO: 0.79 THOUSAND/UL (ref 0–1.22)
MONOCYTES NFR BLD: 2 % (ref 4–12)
MONOCYTES NFR BLD: 4 % (ref 4–12)
MUCOUS THREADS UR QL AUTO: ABNORMAL
NEUTROPHILS # BLD MANUAL: 18.49 THOUSAND/UL (ref 1.85–7.62)
NEUTROPHILS # BLD MANUAL: 29.94 THOUSAND/UL (ref 1.85–7.62)
NEUTS BAND NFR BLD MANUAL: 22 % (ref 0–8)
NEUTS BAND NFR BLD MANUAL: 60 % (ref 0–8)
NEUTS SEG NFR BLD AUTO: 36 % (ref 43–75)
NEUTS SEG NFR BLD AUTO: 72 % (ref 43–75)
NITRITE UR QL STRIP: POSITIVE
NON-SQ EPI CELLS URNS QL MICRO: ABNORMAL /HPF
NRBC BLD AUTO-RTO: 0 /100 WBCS
NRBC BLD AUTO-RTO: 0 /100 WBCS
NT-PROBNP SERPL-MCNC: 3612 PG/ML
P AXIS: 12 DEGREES
PH UR STRIP.AUTO: 6 [PH]
PHOSPHATE SERPL-MCNC: 3.7 MG/DL (ref 2.3–4.1)
PLATELET # BLD AUTO: 210 THOUSANDS/UL (ref 149–390)
PLATELET # BLD AUTO: 254 THOUSANDS/UL (ref 149–390)
PLATELET BLD QL SMEAR: ADEQUATE
PLATELET BLD QL SMEAR: ADEQUATE
PMV BLD AUTO: 10.9 FL (ref 8.9–12.7)
PMV BLD AUTO: 12 FL (ref 8.9–12.7)
POTASSIUM SERPL-SCNC: 3.1 MMOL/L (ref 3.5–5.3)
POTASSIUM SERPL-SCNC: 3.1 MMOL/L (ref 3.5–5.3)
PR INTERVAL: 206 MS
PROCALCITONIN SERPL-MCNC: 1.71 NG/ML
PROCALCITONIN SERPL-MCNC: 2.4 NG/ML
PROT SERPL-MCNC: 6.3 G/DL (ref 6.4–8.2)
PROT UR STRIP-MCNC: ABNORMAL MG/DL
PROTHROMBIN TIME: 57.9 SECONDS (ref 9.8–12)
PROTHROMBIN TIME: 78.3 SECONDS (ref 9.8–12)
QRS AXIS: -21 DEGREES
QRSD INTERVAL: 100 MS
QT INTERVAL: 334 MS
QTC INTERVAL: 439 MS
RBC # BLD AUTO: 3.15 MILLION/UL (ref 3.81–5.12)
RBC # BLD AUTO: 3.31 MILLION/UL (ref 3.81–5.12)
RBC #/AREA URNS AUTO: ABNORMAL /HPF
RBC MORPH BLD: PRESENT
RBC MORPH BLD: PRESENT
RSV RNA NPH QL NAA+PROBE: NORMAL
S PNEUM AG UR QL: NEGATIVE
SODIUM SERPL-SCNC: 133 MMOL/L (ref 136–145)
SODIUM SERPL-SCNC: 137 MMOL/L (ref 136–145)
SP GR UR STRIP.AUTO: 1.02 (ref 1–1.03)
T WAVE AXIS: 52 DEGREES
TOTAL CELLS COUNTED SPEC: 100
TOTAL CELLS COUNTED SPEC: 100
TOXIC GRANULES BLD QL SMEAR: PRESENT
TROPONIN I SERPL-MCNC: <0.02 NG/ML
UROBILINOGEN UR QL STRIP.AUTO: 1 E.U./DL
VENTRICULAR RATE: 104 BPM
WBC # BLD AUTO: 19.67 THOUSAND/UL (ref 4.31–10.16)
WBC # BLD AUTO: 31.19 THOUSAND/UL (ref 4.31–10.16)
WBC #/AREA URNS AUTO: ABNORMAL /HPF
WBC TOXIC VACUOLES BLD QL SMEAR: PRESENT

## 2020-01-01 PROCEDURE — 96361 HYDRATE IV INFUSION ADD-ON: CPT

## 2020-01-01 PROCEDURE — 93005 ELECTROCARDIOGRAM TRACING: CPT

## 2020-01-01 PROCEDURE — 96365 THER/PROPH/DIAG IV INF INIT: CPT

## 2020-01-01 PROCEDURE — 87081 CULTURE SCREEN ONLY: CPT | Performed by: ANESTHESIOLOGY

## 2020-01-01 PROCEDURE — 84145 PROCALCITONIN (PCT): CPT | Performed by: NURSE PRACTITIONER

## 2020-01-01 PROCEDURE — 99285 EMERGENCY DEPT VISIT HI MDM: CPT

## 2020-01-01 PROCEDURE — 99238 HOSP IP/OBS DSCHRG MGMT 30/<: CPT | Performed by: NURSE PRACTITIONER

## 2020-01-01 PROCEDURE — 87631 RESP VIRUS 3-5 TARGETS: CPT | Performed by: NURSE PRACTITIONER

## 2020-01-01 PROCEDURE — 85007 BL SMEAR W/DIFF WBC COUNT: CPT | Performed by: NURSE PRACTITIONER

## 2020-01-01 PROCEDURE — 85610 PROTHROMBIN TIME: CPT | Performed by: EMERGENCY MEDICINE

## 2020-01-01 PROCEDURE — 99223 1ST HOSP IP/OBS HIGH 75: CPT | Performed by: ANESTHESIOLOGY

## 2020-01-01 PROCEDURE — 93010 ELECTROCARDIOGRAM REPORT: CPT | Performed by: INTERNAL MEDICINE

## 2020-01-01 PROCEDURE — 94664 DEMO&/EVAL PT USE INHALER: CPT

## 2020-01-01 PROCEDURE — G0379 DIRECT REFER HOSPITAL OBSERV: HCPCS

## 2020-01-01 PROCEDURE — 99292 CRITICAL CARE ADDL 30 MIN: CPT | Performed by: EMERGENCY MEDICINE

## 2020-01-01 PROCEDURE — 80053 COMPREHEN METABOLIC PANEL: CPT | Performed by: EMERGENCY MEDICINE

## 2020-01-01 PROCEDURE — 80048 BASIC METABOLIC PNL TOTAL CA: CPT | Performed by: NURSE PRACTITIONER

## 2020-01-01 PROCEDURE — 94640 AIRWAY INHALATION TREATMENT: CPT

## 2020-01-01 PROCEDURE — 81001 URINALYSIS AUTO W/SCOPE: CPT | Performed by: EMERGENCY MEDICINE

## 2020-01-01 PROCEDURE — 84100 ASSAY OF PHOSPHORUS: CPT | Performed by: NURSE PRACTITIONER

## 2020-01-01 PROCEDURE — NC001 PR NO CHARGE: Performed by: ANESTHESIOLOGY

## 2020-01-01 PROCEDURE — 85027 COMPLETE CBC AUTOMATED: CPT | Performed by: EMERGENCY MEDICINE

## 2020-01-01 PROCEDURE — NC001 PR NO CHARGE: Performed by: PHYSICIAN ASSISTANT

## 2020-01-01 PROCEDURE — 71045 X-RAY EXAM CHEST 1 VIEW: CPT

## 2020-01-01 PROCEDURE — 87040 BLOOD CULTURE FOR BACTERIA: CPT | Performed by: EMERGENCY MEDICINE

## 2020-01-01 PROCEDURE — 83735 ASSAY OF MAGNESIUM: CPT | Performed by: EMERGENCY MEDICINE

## 2020-01-01 PROCEDURE — 84484 ASSAY OF TROPONIN QUANT: CPT | Performed by: EMERGENCY MEDICINE

## 2020-01-01 PROCEDURE — 85007 BL SMEAR W/DIFF WBC COUNT: CPT | Performed by: EMERGENCY MEDICINE

## 2020-01-01 PROCEDURE — 85027 COMPLETE CBC AUTOMATED: CPT | Performed by: NURSE PRACTITIONER

## 2020-01-01 PROCEDURE — 85610 PROTHROMBIN TIME: CPT | Performed by: NURSE PRACTITIONER

## 2020-01-01 PROCEDURE — 83605 ASSAY OF LACTIC ACID: CPT | Performed by: EMERGENCY MEDICINE

## 2020-01-01 PROCEDURE — 71260 CT THORAX DX C+: CPT

## 2020-01-01 PROCEDURE — 94760 N-INVAS EAR/PLS OXIMETRY 1: CPT

## 2020-01-01 PROCEDURE — 85730 THROMBOPLASTIN TIME PARTIAL: CPT | Performed by: EMERGENCY MEDICINE

## 2020-01-01 PROCEDURE — 83880 ASSAY OF NATRIURETIC PEPTIDE: CPT | Performed by: EMERGENCY MEDICINE

## 2020-01-01 PROCEDURE — 99234 HOSP IP/OBS SM DT SF/LOW 45: CPT | Performed by: NURSE PRACTITIONER

## 2020-01-01 PROCEDURE — 87449 NOS EACH ORGANISM AG IA: CPT | Performed by: NURSE PRACTITIONER

## 2020-01-01 PROCEDURE — 36415 COLL VENOUS BLD VENIPUNCTURE: CPT | Performed by: EMERGENCY MEDICINE

## 2020-01-01 PROCEDURE — 99291 CRITICAL CARE FIRST HOUR: CPT | Performed by: EMERGENCY MEDICINE

## 2020-01-01 PROCEDURE — 96375 TX/PRO/DX INJ NEW DRUG ADDON: CPT

## 2020-01-01 PROCEDURE — 83735 ASSAY OF MAGNESIUM: CPT | Performed by: NURSE PRACTITIONER

## 2020-01-01 RX ORDER — GLYCOPYRROLATE 0.2 MG/ML
0.2 INJECTION INTRAMUSCULAR; INTRAVENOUS
Status: CANCELLED | OUTPATIENT
Start: 2020-01-01

## 2020-01-01 RX ORDER — GLYCOPYRROLATE 0.2 MG/ML
0.2 INJECTION INTRAMUSCULAR; INTRAVENOUS EVERY 4 HOURS
Status: DISCONTINUED | OUTPATIENT
Start: 2020-01-01 | End: 2020-01-01 | Stop reason: HOSPADM

## 2020-01-01 RX ORDER — ACETAMINOPHEN 650 MG/1
650 SUPPOSITORY RECTAL EVERY 6 HOURS PRN
Status: DISCONTINUED | OUTPATIENT
Start: 2020-01-01 | End: 2020-01-01 | Stop reason: HOSPADM

## 2020-01-01 RX ORDER — ACETYLCYSTEINE 100 MG/ML
SOLUTION ORAL; RESPIRATORY (INHALATION) EVERY 6 HOURS
COMMUNITY

## 2020-01-01 RX ORDER — MEROPENEM 1 G/1
1000 INJECTION, POWDER, FOR SOLUTION INTRAVENOUS EVERY 8 HOURS
Status: DISCONTINUED | OUTPATIENT
Start: 2020-01-01 | End: 2020-01-01 | Stop reason: SDUPTHER

## 2020-01-01 RX ORDER — ACETAMINOPHEN 325 MG/1
650 TABLET ORAL EVERY 6 HOURS PRN
Status: DISCONTINUED | OUTPATIENT
Start: 2020-01-01 | End: 2020-01-01

## 2020-01-01 RX ORDER — LORAZEPAM 2 MG/ML
0.5 INJECTION INTRAMUSCULAR
Status: CANCELLED | OUTPATIENT
Start: 2020-01-01

## 2020-01-01 RX ORDER — AMITRIPTYLINE HYDROCHLORIDE 25 MG/1
25 TABLET, FILM COATED ORAL
Status: DISCONTINUED | OUTPATIENT
Start: 2020-01-01 | End: 2020-01-01

## 2020-01-01 RX ORDER — GLYCOPYRROLATE 0.2 MG/ML
0.2 INJECTION INTRAMUSCULAR; INTRAVENOUS EVERY 4 HOURS
Status: CANCELLED | OUTPATIENT
Start: 2020-01-01

## 2020-01-01 RX ORDER — ALBUTEROL SULFATE 2.5 MG/3ML
2.5 SOLUTION RESPIRATORY (INHALATION) EVERY 6 HOURS PRN
COMMUNITY

## 2020-01-01 RX ORDER — ONDANSETRON 2 MG/ML
4 INJECTION INTRAMUSCULAR; INTRAVENOUS EVERY 8 HOURS PRN
Status: DISCONTINUED | OUTPATIENT
Start: 2020-01-01 | End: 2020-01-01

## 2020-01-01 RX ORDER — LORAZEPAM 2 MG/ML
0.5 INJECTION INTRAMUSCULAR
Status: DISCONTINUED | OUTPATIENT
Start: 2020-01-01 | End: 2020-01-01 | Stop reason: HOSPADM

## 2020-01-01 RX ORDER — POTASSIUM CHLORIDE 20 MEQ/1
20 TABLET, EXTENDED RELEASE ORAL 2 TIMES DAILY
COMMUNITY

## 2020-01-01 RX ORDER — ONDANSETRON 2 MG/ML
4 INJECTION INTRAMUSCULAR; INTRAVENOUS EVERY 6 HOURS PRN
Status: DISCONTINUED | OUTPATIENT
Start: 2020-01-01 | End: 2020-01-01 | Stop reason: HOSPADM

## 2020-01-01 RX ORDER — GLYCOPYRROLATE 0.2 MG/ML
0.2 INJECTION INTRAMUSCULAR; INTRAVENOUS
Status: DISCONTINUED | OUTPATIENT
Start: 2020-01-01 | End: 2020-01-01 | Stop reason: HOSPADM

## 2020-01-01 RX ORDER — LEVOTHYROXINE SODIUM 0.12 MG/1
125 TABLET ORAL
Status: DISCONTINUED | OUTPATIENT
Start: 2020-01-01 | End: 2020-01-01

## 2020-01-01 RX ORDER — GUAIFENESIN 100 MG/5ML
100 SOLUTION ORAL EVERY 4 HOURS PRN
COMMUNITY

## 2020-01-01 RX ORDER — MYCOPHENOLATE MOFETIL 250 MG/1
500 CAPSULE ORAL DAILY
Status: DISCONTINUED | OUTPATIENT
Start: 2020-01-01 | End: 2020-01-01

## 2020-01-01 RX ORDER — VENLAFAXINE HYDROCHLORIDE 150 MG/1
150 CAPSULE, EXTENDED RELEASE ORAL DAILY
Status: DISCONTINUED | OUTPATIENT
Start: 2020-01-01 | End: 2020-01-01

## 2020-01-01 RX ORDER — ACETAMINOPHEN 650 MG/1
650 SUPPOSITORY RECTAL EVERY 6 HOURS PRN
Status: CANCELLED | OUTPATIENT
Start: 2020-01-01

## 2020-01-01 RX ORDER — ACETYLCYSTEINE 200 MG/ML
3 SOLUTION ORAL; RESPIRATORY (INHALATION)
Status: DISCONTINUED | OUTPATIENT
Start: 2020-01-01 | End: 2020-01-01

## 2020-01-01 RX ORDER — PHYTONADIONE 5 MG/1
5 TABLET ORAL ONCE
COMMUNITY

## 2020-01-01 RX ORDER — MEROPENEM 1 G/1
1000 INJECTION, POWDER, FOR SOLUTION INTRAVENOUS EVERY 8 HOURS
Status: DISCONTINUED | OUTPATIENT
Start: 2020-01-01 | End: 2020-01-01

## 2020-01-01 RX ORDER — LEVALBUTEROL 1.25 MG/.5ML
1.25 SOLUTION, CONCENTRATE RESPIRATORY (INHALATION)
Status: DISCONTINUED | OUTPATIENT
Start: 2020-01-01 | End: 2020-01-01

## 2020-01-01 RX ORDER — METHYLPREDNISOLONE SODIUM SUCCINATE 40 MG/ML
60 INJECTION, POWDER, LYOPHILIZED, FOR SOLUTION INTRAMUSCULAR; INTRAVENOUS ONCE
Status: COMPLETED | OUTPATIENT
Start: 2020-01-01 | End: 2020-01-01

## 2020-01-01 RX ORDER — LANOLIN ALCOHOL/MO/W.PET/CERES
6 CREAM (GRAM) TOPICAL
Status: DISCONTINUED | OUTPATIENT
Start: 2020-01-01 | End: 2020-01-01

## 2020-01-01 RX ORDER — TOPIRAMATE 25 MG/1
25 TABLET ORAL 2 TIMES DAILY
Status: DISCONTINUED | OUTPATIENT
Start: 2020-01-01 | End: 2020-01-01

## 2020-01-01 RX ORDER — ONDANSETRON 2 MG/ML
4 INJECTION INTRAMUSCULAR; INTRAVENOUS EVERY 6 HOURS PRN
Status: CANCELLED | OUTPATIENT
Start: 2020-01-01

## 2020-01-01 RX ORDER — SODIUM CHLORIDE, SODIUM GLUCONATE, SODIUM ACETATE, POTASSIUM CHLORIDE, MAGNESIUM CHLORIDE, SODIUM PHOSPHATE, DIBASIC, AND POTASSIUM PHOSPHATE .53; .5; .37; .037; .03; .012; .00082 G/100ML; G/100ML; G/100ML; G/100ML; G/100ML; G/100ML; G/100ML
100 INJECTION, SOLUTION INTRAVENOUS CONTINUOUS
Status: DISCONTINUED | OUTPATIENT
Start: 2020-01-01 | End: 2020-01-01

## 2020-01-01 RX ORDER — BACLOFEN 10 MG/1
5 TABLET ORAL 3 TIMES DAILY
Status: DISCONTINUED | OUTPATIENT
Start: 2020-01-01 | End: 2020-01-01

## 2020-01-01 RX ADMIN — IPRATROPIUM BROMIDE 0.5 MG: 0.5 SOLUTION RESPIRATORY (INHALATION) at 19:35

## 2020-01-01 RX ADMIN — ACETYLCYSTEINE 600 MG: 200 SOLUTION ORAL; RESPIRATORY (INHALATION) at 07:53

## 2020-01-01 RX ADMIN — SODIUM CHLORIDE, SODIUM GLUCONATE, SODIUM ACETATE, POTASSIUM CHLORIDE, MAGNESIUM CHLORIDE, SODIUM PHOSPHATE, DIBASIC, AND POTASSIUM PHOSPHATE 100 ML/HR: .53; .5; .37; .037; .03; .012; .00082 INJECTION, SOLUTION INTRAVENOUS at 02:53

## 2020-01-01 RX ADMIN — MORPHINE SULFATE 2 MG: 2 INJECTION, SOLUTION INTRAMUSCULAR; INTRAVENOUS at 14:47

## 2020-01-01 RX ADMIN — LEVALBUTEROL HYDROCHLORIDE 1.25 MG: 1.25 SOLUTION, CONCENTRATE RESPIRATORY (INHALATION) at 07:53

## 2020-01-01 RX ADMIN — IPRATROPIUM BROMIDE 0.5 MG: 0.5 SOLUTION RESPIRATORY (INHALATION) at 07:53

## 2020-01-01 RX ADMIN — VANCOMYCIN HYDROCHLORIDE 1250 MG: 10 INJECTION, POWDER, LYOPHILIZED, FOR SOLUTION INTRAVENOUS at 14:41

## 2020-01-01 RX ADMIN — IOHEXOL 75 ML: 350 INJECTION, SOLUTION INTRAVENOUS at 11:00

## 2020-01-01 RX ADMIN — SODIUM CHLORIDE, SODIUM GLUCONATE, SODIUM ACETATE, POTASSIUM CHLORIDE, MAGNESIUM CHLORIDE, SODIUM PHOSPHATE, DIBASIC, AND POTASSIUM PHOSPHATE 100 ML/HR: .53; .5; .37; .037; .03; .012; .00082 INJECTION, SOLUTION INTRAVENOUS at 15:27

## 2020-01-01 RX ADMIN — IPRATROPIUM BROMIDE 0.5 MG: 0.5 SOLUTION RESPIRATORY (INHALATION) at 15:27

## 2020-01-01 RX ADMIN — MEROPENEM 1000 MG: 1 INJECTION, POWDER, FOR SOLUTION INTRAVENOUS at 00:06

## 2020-01-01 RX ADMIN — LEVALBUTEROL HYDROCHLORIDE 1.25 MG: 1.25 SOLUTION, CONCENTRATE RESPIRATORY (INHALATION) at 19:35

## 2020-01-01 RX ADMIN — MORPHINE SULFATE 2 MG: 2 INJECTION, SOLUTION INTRAMUSCULAR; INTRAVENOUS at 12:37

## 2020-01-01 RX ADMIN — Medication 1 MG/HR: at 16:09

## 2020-01-01 RX ADMIN — ACETYLCYSTEINE 600 MG: 200 SOLUTION ORAL; RESPIRATORY (INHALATION) at 01:31

## 2020-01-01 RX ADMIN — IPRATROPIUM BROMIDE 0.5 MG: 0.5 SOLUTION RESPIRATORY (INHALATION) at 12:25

## 2020-01-01 RX ADMIN — IPRATROPIUM BROMIDE 0.5 MG: 0.5 SOLUTION RESPIRATORY (INHALATION) at 01:32

## 2020-01-01 RX ADMIN — MEROPENEM 1000 MG: 1 INJECTION, POWDER, FOR SOLUTION INTRAVENOUS at 09:32

## 2020-01-01 RX ADMIN — ACETYLCYSTEINE 600 MG: 200 SOLUTION ORAL; RESPIRATORY (INHALATION) at 19:35

## 2020-01-01 RX ADMIN — MEROPENEM 1000 MG: 1 INJECTION, POWDER, FOR SOLUTION INTRAVENOUS at 16:44

## 2020-01-01 RX ADMIN — GENTAMICIN SULFATE 70 MG: 40 INJECTION, SOLUTION INTRAMUSCULAR; INTRAVENOUS at 14:03

## 2020-01-01 RX ADMIN — MORPHINE SULFATE 2 MG: 2 INJECTION, SOLUTION INTRAMUSCULAR; INTRAVENOUS at 11:21

## 2020-01-01 RX ADMIN — METHYLPREDNISOLONE SODIUM SUCCINATE 60 MG: 40 INJECTION, POWDER, FOR SOLUTION INTRAMUSCULAR; INTRAVENOUS at 12:29

## 2020-01-01 RX ADMIN — LORAZEPAM 0.5 MG: 2 INJECTION INTRAMUSCULAR; INTRAVENOUS at 11:42

## 2020-01-01 RX ADMIN — LEVALBUTEROL HYDROCHLORIDE 1.25 MG: 1.25 SOLUTION, CONCENTRATE RESPIRATORY (INHALATION) at 01:31

## 2020-01-01 RX ADMIN — ALBUTEROL SULFATE 5 MG: 2.5 SOLUTION RESPIRATORY (INHALATION) at 12:25

## 2020-01-01 RX ADMIN — LEVALBUTEROL HYDROCHLORIDE 1.25 MG: 1.25 SOLUTION, CONCENTRATE RESPIRATORY (INHALATION) at 15:27

## 2020-01-01 RX ADMIN — SODIUM CHLORIDE 500 ML: 0.9 INJECTION, SOLUTION INTRAVENOUS at 12:31

## 2020-01-01 RX ADMIN — VANCOMYCIN HYDROCHLORIDE 1250 MG: 1 INJECTION, POWDER, LYOPHILIZED, FOR SOLUTION INTRAVENOUS at 02:45

## 2020-01-16 PROBLEM — I10 ESSENTIAL HYPERTENSION: Chronic | Status: ACTIVE | Noted: 2019-01-01

## 2020-01-16 PROBLEM — M19.90 OSTEOARTHRITIS: Status: ACTIVE | Noted: 2020-01-01

## 2020-01-16 PROBLEM — G89.4 CHRONIC PAIN SYNDROME: Chronic | Status: ACTIVE | Noted: 2019-01-01

## 2020-01-16 PROBLEM — M81.0 OSTEOPOROSIS: Chronic | Status: ACTIVE | Noted: 2019-01-01

## 2020-01-16 PROBLEM — L02.611 ABSCESS, TOE, RIGHT: Status: RESOLVED | Noted: 2019-01-01 | Resolved: 2020-01-01

## 2020-01-16 PROBLEM — D68.32 WARFARIN-INDUCED COAGULOPATHY (HCC): Status: ACTIVE | Noted: 2020-01-01

## 2020-01-16 PROBLEM — M81.0 OSTEOPOROSIS: Status: ACTIVE | Noted: 2019-01-01

## 2020-01-16 PROBLEM — I70.209 ATHEROSCLEROSIS OF ARTERIES OF EXTREMITIES (HCC): Status: ACTIVE | Noted: 2017-12-26

## 2020-01-16 PROBLEM — J18.9 BILATERAL PNEUMONIA: Status: ACTIVE | Noted: 2020-01-01

## 2020-01-16 PROBLEM — I10 ESSENTIAL HYPERTENSION: Status: ACTIVE | Noted: 2019-01-01

## 2020-01-16 PROBLEM — J96.01 ACUTE RESPIRATORY FAILURE WITH HYPOXIA (HCC): Status: ACTIVE | Noted: 2020-01-01

## 2020-01-16 PROBLEM — T45.515A WARFARIN-INDUCED COAGULOPATHY (HCC): Status: ACTIVE | Noted: 2020-01-01

## 2020-01-16 PROBLEM — R60.9 CHRONIC EDEMA: Chronic | Status: ACTIVE | Noted: 2019-01-01

## 2020-01-16 NOTE — PROGRESS NOTES
Email reply from facility director Adanbhavya Chonges 1/15/20 04:07 reads:     "Update on Donnise Finder- she spiked a fever 1/10 to 102 4  Was negative for flu  Chest xray not remarkable has cough    could be bronchitis per weekend coverage- is on nebs q6 hrs  Urine - pos 100,000 + pos for Pseudomonas    Is now on Gentamycin IV until 1/21 and Vancomycin IV for T>100- she was 100 2 today  also receiving IVF    so we are keeping her skilled for now and will update you as her condition changes"    This care manager will continue to monitor via chart review throughout bundle episode

## 2020-01-16 NOTE — ASSESSMENT & PLAN NOTE
· She has a hx of ESBL UTI, will treat with Meropenum and Vanco for now  · ID c/s placed  · Urine strep and Legionella pending  · Lactate WNL  · Vapotherm for support

## 2020-01-16 NOTE — PROGRESS NOTES
Vancomycin Assessment    Jane Pagan is a 78 y o  female who is currently receiving vancomycin 1250mg IVPB q12 hours for Pneumonia     Relevant clinical data and objective history reviewed:  Creatinine   Date Value Ref Range Status   01/16/2020 0 63 0 60 - 1 30 mg/dL Final     Comment:     Standardized to IDMS reference method   12/26/2019 0 93 0 60 - 1 30 mg/dL Final     Comment:     Standardized to IDMS reference method   12/25/2019 0 85 0 60 - 1 30 mg/dL Final     Comment:     Standardized to IDMS reference method   11/24/2015 0 9 0 6 - 1 3 mg/dL    10/30/2015 1 2 0 6 - 1 3 mg/dL    08/31/2015 0 69 0 60 - 1 30 mg/dL Final     Comment:     Standardized to IDMS reference method     VANCOMYCIN RANDOM   Date Value Ref Range Status   08/30/2015 14 9 mcg/mL Final     Comment:     The above 1 analytes were performed by Abhishek  [address] [place] [address]       /62 (BP Location: Left arm)   Pulse 104   Temp 98 2 °F (36 8 °C) (Temporal)   Resp (!) 26   Ht 5' 7 5" (1 715 m)   Wt 89 kg (196 lb 3 4 oz)   SpO2 92%   BMI 30 28 kg/m²   No intake/output data recorded  Lab Results   Component Value Date/Time    BUN 16 01/16/2020 12:12 PM    BUN 20 11/24/2015 10:15 AM    WBC 31 19 (HH) 01/16/2020 12:12 PM    WBC 6 1 12/16/2015 10:30 AM    HGB 10 8 (L) 01/16/2020 12:12 PM    HGB 12 2 12/16/2015 10:30 AM    HCT 33 5 (L) 01/16/2020 12:12 PM    HCT 39 3 12/16/2015 10:30 AM     (H) 01/16/2020 12:12 PM    MCV 99 0 (H) 12/16/2015 10:30 AM     01/16/2020 12:12 PM     12/16/2015 10:30 AM     Temp Readings from Last 3 Encounters:   01/16/20 98 2 °F (36 8 °C) (Temporal)   12/27/19 99 1 °F (37 3 °C) (Oral)   10/26/18 (!) 97 3 °F (36 3 °C) (Tympanic)     Vancomycin Days of Therapy: 1    Assessment/Plan  The patient is currently on vancomycin utilizing scheduled dosing based on adjusted body weight (due to obesity)  Baseline risks associated with therapy include: advanced age    The patient is currently receiving 1250mg IVPB q12 hours and is clinically appropriate and dose will be continued  Pharmacy will also follow closely for s/sx of nephrotoxicity, infusion reactions and appropriateness of therapy  BMP and CBC will be ordered per protocol  Plan for trough as patient approaches steady state, prior to the 5th  dose at approximately 1400 on 1/18/20  Due to infection severity, will target a trough of 15-20 (appropriate for most indications)   Pharmacy will continue to follow the patients culture results and clinical progress daily      Titus Bailon, Pharmacist

## 2020-01-16 NOTE — ED NOTES
Increased to 10LPM with no effect  Pt placed on NRB at 15LPM until respiratory arrives  Pt resting with eyes closed at this time   Alert to voice yet "tired "     Donell Neighbors, RN  01/16/20 [id number]

## 2020-01-16 NOTE — ASSESSMENT & PLAN NOTE
· Cont vapotherm and wean as tolerated  · If cont to have hypoxia check ABG  · O2 sats stable for now
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
## 2020-01-16 NOTE — RESPIRATORY THERAPY NOTE
01/16/20 1435   Non-Invasive Information   Interface HFNC prongs   Non-Invasive Ventilation Mode HFNC  (vapotherm)   SpO2 94 %   $ Pulse Oximetry Spot Check Charge Completed   Non-Invasive Settings   FiO2 (%) 100   Flow (lpm) 40   Temperature (Set) 36   Non-Invasive Readings   Heater Temperature (Obs) 30   Skin Intervention Skin intact

## 2020-01-16 NOTE — ED NOTES
Pt pulseox decreased to 78% on mask  Oxygen increased to 8LPM   Dr at bedside  Respiratory called for BiPap           Mariposa Holbrook RN  01/16/20 5116

## 2020-01-16 NOTE — SEPSIS NOTE
Sepsis Note   Shayne Ingram 78 y o  female MRN: [id number]  Unit/Bed#: ED CT2 Encounter: [id number]      qSOFA     Row Name 01/16/20 1242 01/16/20 1202             Altered mental status GCS < 15           Respiratory Rate > / =22  1  1       Systolic BP < / =624  0  0       Q Sofa Score  1  1           Initial Sepsis Screening     Row Name 01/16/20 1309                Is the patient's history suggestive of a new or worsening infection?         Suspected source of infection  pneumonia;urinary tract infection  -KF        Are two or more of the following signs & symptoms of infection both present and new to the patient?         Indicate SIRS criteria  Leukocytosis (WBC > 94086 IJL);WBC > 10% bands; Tachycardia > 90 bpm  -KF        If the answer is yes to both questions, suspicion of sepsis is present          If severe sepsis is present AND tissue hypoperfusion perists in the hour after fluid resuscitation or lactate > 4, the patient meets criteria for SEPTIC SHOCK          Are any of the following organ dysfunction criteria present within 6 hours of suspected infection and SIRS criteria that are NOT considered to be chronic conditions? (!) Yes  -KF        Organ dysfunction          Date of presentation of severe sepsis          Time of presentation of severe sepsis          Tissue hypoperfusion persists in the hour after crystalloid fluid administration, evidenced, by either:          Was hypotension present within one hour of the conclusion of crystalloid fluid administration?           Date of presentation of septic shock          Time of presentation of septic shock            User Key  (r) = Recorded By, (t) = Taken By, (c) = Cosigned By    Initials Name Provider Type    [address], DO Physician

## 2020-01-16 NOTE — ED NOTES
Pt pulseox 80% on 6LPM NC  Pt reminded to breathe through nose  Pt pulseox not increasing  Pt placed on simple face mask at 6LPM and recovered to 94% within several minutes  Will continue to monitor  Dr Katie Padilla updated       Lenka Bauer RN  01/16/20 9280

## 2020-01-16 NOTE — ED PROVIDER NOTES
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      topiramate (TOPAMAX) 25 mg tablet Take 1 tablet (25 mg total) by mouth 2 (two) times a day  Qty: 60 tablet, Refills: 0    Associated Diagnoses: Chronic pain syndrome; Neuropathic pain; 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[facility])      venlafaxine (EFFEXOR-XR) 150 mg 24 hr capsule TAKE 1 CAPSULE BY MOUTH EVERY DAY  Qty: 30 capsule, Refills: 4    Associated Diagnoses: Polyneuropathy      warfarin (COUMADIN) 4 mg tablet Take 4 mg by mouth every evening       Heparin Sodium, Porcine, (HEPARIN, PORCINE,) 5,000 units/mL Inject 1 mL (5,000 Units total) under the skin every 8 (eight) hours Until coumadin therapeutic  Qty: 1 mL, Refills: 0    Associated Diagnoses: History of DVT (deep vein thrombosis)      magnesium hydroxide (MILK OF MAGNESIA) 400 mg/5 mL oral suspension Take 30 mL by mouth 2 (two) times a day as needed for constipation or heartburn  Qty: 360 mL, Refills: 0    Associated Diagnoses: Constipation           No discharge procedures on file      ED Provider  Electronically Signed by           Queta Rosaroi DO  01/16/20 1332

## 2020-01-16 NOTE — PROGRESS NOTES
Email received from [facility] notification for patient admission 1/16/2020 at [facility] Emergency Department for c/o of SOB  This care manager will continue to monitor via chart review throughout bundle episode

## 2020-01-17 NOTE — SOCIAL WORK
SW following to support and assist as needed  Pt was evaluated by Jada sanders earlier today  Pt was approved for inpatient hospice level of care and placed on program   SW will continue to follow to support and assist as needed

## 2020-01-17 NOTE — [place] UNIVERSAL TRANSF[place] FORM
[place] UNIV[place] FORM  (ALL ITEMS MUST BE COMPLETED)    1  TRANSFER FROM: [address]      TRANSFER TO: Pierce Newton     2  DATE OF TRANSFER: 1/17/2020                        TIME OF TRANSFER: 1452    3  PATIENT NAME: NANCIE Sanchez      YOB: 1940                             GENDER: female    4  LANGUAGE:   English    5  PHYSICIAN NAME:  No att  providers found                   PHONE: [phone]  CODE STATUS: Level 4 - [facility] DNR Attached: No    7  :                                      :  Extended Emergency Contact Information  Primary Emergency Contact: Sweta James [address] Phone: [phone]  Relation: Daughter  Secondary Emergency Contact: Addy Hicks  Home Phone: [phone]  Mobile Phone: [phone]  Relation: [address] Representative/Proxy:  No           Legal Guardian:  No             NAME OF:           HEALTH CARE REPRESENTATIVE/PROXY:                                         OR           LEGAL GUARDIAN, IF NOT :                                               PHONE:  (Day)           (Night)                        (Cell)    8  REASON FOR TRANSFER: Pt transitioned to hospice care            V/S: BP (!) 96/49   Pulse (!) 108   Temp 97 8 °F (36 6 °C) (Temporal)   Resp (!) 32   Ht 5' 6" (1 676 m)   Wt 85 8 kg (189 lb 2 5 oz)   SpO2 (!) 54%   BMI 30 53 kg/m²           PAIN: None    9  PRIMARY DIAGNOSIS: Bilateral pneumonia      Secondary Diagnosis:         Pacemaker: No      Internal Defib: No          Mental Health Diagnosis (if Applicable):    10  RESTRAINTS: No     11  RESPIRATORY NEEDS: None    12  ISOLATION/PRECAUTION: ESBL    13  ALLERGY: Rituxan [rituximab]; Ultram [tramadol]; Baclofen; Ciprofibrate; Ciprofloxacin; Gabapentin; Hydromorphone; Lyrica [pregabalin];  Talwin [pentazocine]; Dilaudid [hydromorphone hcl]; and Penicillins    14  SENSORY:       Vision Good    15  SKIN CONDITION: Yes:  Pressure stage 4 sacrum pre-existing     16  DIET: Regular    17  IV ACCESS: Saline Lock rt lower peripheral, and port a cath     18  PERSONAL ITEMS SENT WITH PATIENT: Glasses and Dentures Upper Full    19  ATTACHED DOCUMENTS: MUST ATTACH CURRENT MEDICATION INFORMATION Face Sheet, MAR, Medication Reconciliation, Diagnostic Studies, Labs and Code Status    20  AT RISK ALERTS:Falls, Pressure Ulcer and Aspiration        HARM TO: N/A    21  WEIGHT BEARING STATUS:         Left Leg: None        Right Leg: None    22  MENTAL STATUS:Oriented    23  FUNCTION:        Walk: Not Able        Transfer: Not Able        Toilet: Not Able        Feed: With Help    24  IMMUNIZATIONS/SCREENING:     Immunization History   Administered Date(s) Administered    Pneumococcal Conjugate 13-Valent 12/27/2019       25  BOWEL: Incontinent  and Date Last BM1/16/20    32  BLADDER: Davis Catheter suprapubic    27   SENDING FACILITY CONTACT: Chaparrita Lee            Title: RN        Unit: ICU        Phone: [phone] (if known):        Title: Cyndy Love RN         Unit: ICU         Phone:[phone]         FORM PREFILLED BY (if applicable)       Title:       Unit:        Phone:         FORM COMPLETED BY Jose Antonio RN      Title: ALLI      Phone: [phone]

## 2020-01-17 NOTE — DISCHARGE SUMMARY
Discharge Summary - [facility] Critical Care    Patient Information: Angeli Ingram 78 y o  female MRN: [id number]  Unit/Bed#: ICU 04 Encounter: [id number]    Discharging Physician / Practitioner: ADELINE Rockwell  PCP: Millie Guerrier MD    Admission Date: 1/16/2020  Discharge Date: 01/17/20    Reason for Admission: Pneumonia    Discharge Diagnoses:   Principal Problem:    Bilateral pneumonia  Active Problems:    Acute respiratory failure with hypoxia ([facility] [address] )    Warfarin-induced coagulopathy ([facility] [address] )    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[facility] [address] )    Acquired hypothyroidism    Essential hypertension    Functional quadriplegia ([facility] [address] )  Resolved Problems:    * No resolved hospital problems  *      Consultations During Hospital Stay:  · None    Procedures Performed:     · None    Significant Findings:     · None    Incidental Findings:   · None     Test Results Pending at Discharge (will require follow up): · None     Outpatient Tests Requested:  · None    Complications:  [facility] Course:     Izzy Zapata is a 78 y o  female patient who originally presented to the hospital on 1/16/2020 due to pneumonia and sepsis evaluation  Patient has longstanding history of Devic's disease/neuromyelitis optica with progressive muscular weakness and subsequent loss of bowel and bladder function   Patient wears 2 L nasal cannula at baseline   Patient was hypoxic on her supplemental oxygen   Patient was sent to the ED for further evaluation  She was diagnosed with B/L pneumonia about 2 days ago and started on Gentamycin and Vancomycin  Patient has a port in place for IV antibiotics  Has remained hypoxic and was placed on vapotherm O2 with improvement of sats  Tx to SD  While in [address] [place] [address] she continued to have poor oxygenation and this am asked to be kept comfortable  After further discussion patient has decided she want to be on comfort care  Call hospice and tx to inpt bed      Condition at Discharge: stable     Discharge Day Visit / Exam:     * Please refer to separate progress for these details *    Discharge instructions/Information to patient and family:   See after visit summary for information provided to patient and family  Provisions for Follow-Up Care:  See after visit summary for information related to follow-up care and any pertinent home health orders  Disposition: [place]/ Comfort Care    Planned Readmission: none    Discharge Statement:  I spent 20 minutes discharging the patient  This time was spent on the day of discharge  I had direct contact with the patient on the day of discharge  Greater than 50% of the total time was spent examining patient, answering all patient questions, arranging and discussing plan of care with patient as well as directly providing post-discharge instructions  Additional time then spent on discharge activities  Discharge Medications:  See after visit summary for reconciled discharge medications provided to patient and family      Discharge Diet: as tolerated  Activity restrictions: none    ADELINE Mitchell  1/17/2020  4:34 PM

## 2020-01-17 NOTE — DISCHARGE SUMMARY
Discharge Summary - Siva Ingram 78 y o  female MRN: [id number]    Unit/Bed#: ICU 04 Encounter: [id number] PCP: Aspen Chua MD    Admission Date:   Admission Orders (From admission, onward)     Ordered        20 1509  Admit Patient to  Once                     Admitting Diagnosis: Palliative care patient [Z51 5]    HPI/Summary: per Krystina Aguilar is a 78 y o  female with pmhx of MRSA, depression, anxiety, lung cancer, vitamin-D deficiency, peripheral neuropathy, hypothyroidism, DVT on Coumadin, presents to the ED from SNF for pneumonia and sepsis evaluation  Patient has longstanding history of Devic's disease/neuromyelitis optica with progressive muscular weakness and subsequent loss of bowel and bladder function   Patient wears 2 L nasal cannula at baseline   Patient was hypoxic on her supplemental oxygen   Patient was sent to the ED for further evaluation  She was diagnosed with B/L pneumonia about 2 days ago and started on Gentamycin and Vancomycin  Patient has a port in place for IV antibiotics  Has remained hypoxic and was placed on vapotherm O2 with improvement of sats  Tx to SD  While in [address] [place] [address] she continued to have poor oxygenation and this am asked to be kept comfortable  After further discussion patient has decided she want to be on comfort care  Call hospice and tx to inpt bed "    Procedures Performed: No orders of the defined types were placed in this encounter        Significant Findings, Care, Treatment and Services Provided: none    Complications: none    Disposition:      Final Diagnosis: pneumonia    Resolved Problems  Date Reviewed: 2019    None          Condition at Time of Death: critically ill    Date, Time and Cause of Death    Date of Death:  20  Time of Death:   6:34 PM  Preliminary Cause of Death:  Pneumonia

## 2020-01-17 NOTE — DEATH NOTE
INPATIENT DEATH NOTE  Usman Ingram 78 y o  female MRN: [id number]  Unit/Bed#: ICU 04 Encounter: [id number]    Date, Time and Cause of Death    Date of Death:  20  Time of Death:   6:34 PM  Preliminary Cause of Death:  Pneumonia          Patient's Information  Pronounced by: Jacky ERAZO  Did the patient's death occur in the ED?: No  Did the patient's death occur in the OR?: No  Did the patient's death occur less than 10 days post-op?: No  Did the patient's death occur within 24 hours of admission?: No  Was code status DNR at the time of death?: Yes    PHYSICAL EXAM:  Unresponsive to noxious stimuli, Spontaneous respirations absent, Breath sounds absent, Carotid pulse absent, Heart sounds absent, Pupillary light reflex absent and Corneal blink reflex absent    Medical Examiner notification criteria:  NONE APPLICABLE   Medical Examiner's office notified?:  No, does not meet ME notification criteria   Medical Examiner accepted case?:  No  Name of Medical Examiner:     Family Notification  Was the family notified?: Yes  Date Notified: 20  Time Notified: 6346  Notified by: Jacky Mendieta [facility]  Name of Family Notified of Death: Susannasuman Vaca (At patient bedside )   Relationship to Patient: Other (Comment)  Family Notification Route:  At bedside  Was the family told to contact a  home?: Yes    Autopsy Options:  Post-mortem examination declined by next of kin    Primary Service Attending Physician notified?:  yes - Attending:  Dr Tae Chow  Physician/Resident responsible for completing Discharge Summary:  Jacky Mendieta

## 2020-01-17 NOTE — PROGRESS NOTES
Pt made comfort care with family at bedside  Morphine given for comfort  Cardiac monitor on comfort screen as ordered

## 2020-01-17 NOTE — PLAN OF CARE
Problem: INFECTION - ADULT  Goal: Absence or prevention of progression during hospitalization  Description  INTERVENTIONS:  - Assess and monitor for signs and symptoms of infection  - Monitor lab/diagnostic results  - Monitor all insertion sites, i e  indwelling lines, tubes, and drains  - Monitor endotracheal if appropriate and nasal secretions for changes in amount and color  - [place] appropriate cooling/warming therapies per order  - Administer medications as ordered  - Instruct and encourage patient and family to use good hand hygiene technique  - Identify and instruct in appropriate isolation precautions for identified infection/condition  Outcome: Progressing     Problem: SAFETY ADULT  Goal: Patient will remain free of falls  Description  INTERVENTIONS:  - Assess patient frequently for physical needs  -  Identify cognitive and physical deficits and behaviors that affect risk of falls    -  [place] fall precautions as indicated by assessment   - Educate patient/family on patient safety including physical limitations  - Instruct patient to call for assistance with activity based on assessment  - Modify environment to reduce risk of injury  - Consider OT/PT consult to assist with strengthening/mobility  Outcome: Progressing  Goal: Maintain or return to baseline ADL function  Description  INTERVENTIONS:  -  Assess patient's ability to carry out ADLs; assess patient's baseline for ADL function and identify physical deficits which impact ability to perform ADLs (bathing, care of mouth/teeth, toileting, grooming, dressing, etc )  - Assess/evaluate cause of self-care deficits   - Assess range of motion  - Assess patient's mobility; develop plan if impaired  - Assess patient's need for assistive devices and provide as appropriate  - Encourage maximum independence but intervene and supervise when necessary  - Involve family in performance of ADLs  - Assess for home care needs following discharge   - Consider OT consult to assist with ADL evaluation and planning for discharge  - Provide patient education as appropriate  Outcome: Progressing  Goal: Maintain or return mobility status to optimal level  Description  INTERVENTIONS:  - Assess patient's baseline mobility status (ambulation, transfers, stairs, etc )    - Identify cognitive and physical deficits and behaviors that affect mobility  - Identify mobility aids required to assist with transfers and/or ambulation (gait belt, sit-to-stand, lift, walker, cane, etc )  - [place] fall precautions as indicated by assessment  - Record patient progress and toleration of activity level on Mobility SBAR; progress patient to next Phase/Stage  - Instruct patient to call for assistance with activity based on assessment  - Consider rehabilitation consult to assist with strengthening/weightbearing, etc   Outcome: Progressing     Problem: DISCHARGE PLANNING  Goal: Discharge to home or other facility with appropriate resources  Description  INTERVENTIONS:  - Identify barriers to discharge w/patient and caregiver  - Arrange for needed discharge resources and transportation as appropriate  - Identify discharge learning needs (meds, wound care, etc )  - Arrange for interpretive services to assist at discharge as needed  - Refer to Case Management Department for coordinating discharge planning if the patient needs post-hospital services based on physician/advanced practitioner order or complex needs related to functional status, cognitive ability, or social support system  Outcome: Progressing     Problem: Knowledge Deficit  Goal: Patient/family/caregiver demonstrates understanding of disease process, treatment plan, medications, and discharge instructions  Description  Complete learning assessment and assess knowledge base    Interventions:  - Provide teaching at level of understanding  - Provide teaching via preferred learning methods  Outcome: Progressing     Problem: RESPIRATORY - ADULT  Goal: Achieves optimal ventilation and oxygenation  Description  INTERVENTIONS:  - Assess for changes in respiratory status  - Assess for changes in mentation and behavior  - Position to facilitate oxygenation and minimize respiratory effort  - Oxygen administered by appropriate delivery if ordered  - Initiate smoking cessation education as indicated  - Encourage broncho-pulmonary hygiene including cough, deep breathe, Incentive Spirometry  - Assess the need for suctioning and aspirate as needed  - Assess and instruct to report SOB or any respiratory difficulty  - Respiratory Therapy support as indicated  - HFNC   Outcome: Progressing     Problem: MUSCULOSKELETAL - ADULT  Goal: Maintain or return mobility to safest level of function  Description  INTERVENTIONS:  - Assess patient's ability to carry out ADLs; assess patient's baseline for ADL function and identify physical deficits which impact ability to perform ADLs (bathing, care of mouth/teeth, toileting, grooming, dressing, etc )  - Assess/evaluate cause of self-care deficits   - Assess range of motion  - Assess patient's mobility  - Assess patient's need for assistive devices and provide as appropriate  - Encourage maximum independence but intervene and supervise when necessary  - Involve family in performance of ADLs  - Assess for home care needs following discharge   - Consider OT consult to assist with ADL evaluation and planning for discharge  - Provide patient education as appropriate  Outcome: Progressing  Goal: Maintain proper alignment of affected body part  Description  INTERVENTIONS:  - Support, maintain and protect limb and body alignment  - Provide patient/ family with appropriate education  Outcome: Progressing     Problem: GENITOURINARY - ADULT  Goal: Absence of urinary retention  Description  INTERVENTIONS:  - Assess patient's ability to void and empty bladder  - Monitor I/O  - Bladder scan as needed  - Discuss with physician/AP medications to alleviate retention as needed  - Discuss catheterization for long term situations as appropriate   Outcome: Progressing  Goal: Urinary catheter remains patent  Description  INTERVENTIONS:  - Assess patency of urinary catheter  - If patient has a chronic spivey, consider changing catheter if non-functioning  - Follow guidelines for intermittent irrigation of non-functioning urinary catheter  Outcome: Progressing     Problem: SKIN/TISSUE INTEGRITY - ADULT  Goal: Skin integrity remains intact  Description  INTERVENTIONS  - Identify patients at risk for skin breakdown  - Assess and monitor skin integrity  - Assess and monitor nutrition and hydration status  - Monitor labs (i e  albumin)  - Assess for incontinence   - Turn and reposition patient  - Assist with mobility/ambulation  - Relieve pressure over bony prominences  - Avoid friction and shearing  - Provide appropriate hygiene as needed including keeping skin clean and dry  - Evaluate need for skin moisturizer/barrier cream  - Collaborate with interdisciplinary team (i e  Nutrition, Rehabilitation, etc )   - Patient/family teaching  Outcome: Progressing  Goal: Incision(s), wounds(s) or drain site(s) healing without S/S of infection  Description  INTERVENTIONS  - Assess and document risk factors for skin impairment   - Assess and document dressing, incision, wound bed, drain sites and surrounding tissue  - Consider nutrition services referral as needed  - Oral mucous membranes remain intact  - Provide patient/ family education  Outcome: Progressing     Problem: Prexisting or High Potential for Compromised Skin Integrity  Goal: Skin integrity is maintained or improved  Description  INTERVENTIONS:  - Identify patients at risk for skin breakdown  - Assess and monitor skin integrity  - Assess and monitor nutrition and hydration status  - Monitor labs   - Assess for incontinence   - Turn and reposition patient  - Assist with mobility/ambulation  - Relieve pressure over bony prominences  - Avoid friction and shearing  - Provide appropriate hygiene as needed including keeping skin clean and dry  - Evaluate need for skin moisturizer/barrier cream  - Collaborate with interdisciplinary team   - Patient/family teaching  - Consider wound care consult   Outcome: Progressing     Problem: Potential for Falls  Goal: Patient will remain free of falls  Description  INTERVENTIONS:  - Assess patient frequently for physical needs  -  Identify cognitive and physical deficits and behaviors that affect risk of falls  -  [place] fall precautions as indicated by assessment   - Educate patient/family on patient safety including physical limitations  - Instruct patient to call for assistance with activity based on assessment  - Modify environment to reduce risk of injury  - Consider OT/PT consult to assist with strengthening/mobility  Outcome: Progressing     Problem: Nutrition/Hydration-ADULT  Goal: Nutrient/Hydration intake appropriate for improving, restoring or maintaining nutritional needs  Description  Monitor and assess patient's nutrition/hydration status for malnutrition  Collaborate with interdisciplinary team and initiate plan and interventions as ordered  Monitor patient's weight and dietary intake as ordered or per policy  Utilize nutrition screening tool and intervene as necessary  Determine patient's food preferences and provide high-protein, high-caloric foods as appropriate       INTERVENTIONS:  - Monitor oral intake, urinary output, labs, and treatment plans  - Assess nutrition and hydration status and recommend course of action  - Evaluate amount of meals eaten  - Assist patient with eating if necessary   - Allow adequate time for meals  - Recommend/ encourage appropriate diets, oral nutritional supplements, and vitamin/mineral supplements  - Order, calculate, and assess calorie counts as needed  - Recommend, monitor, and adjust tube feedings and TPN/PPN based on assessed needs  - Assess need for intravenous fluids  - Provide specific nutrition/hydration education as appropriate  - Include patient/family/caregiver in decisions related to nutrition  Outcome: Progressing

## 2020-01-17 NOTE — CONSULTS
Consult Note - Wound   Sameera Ingram 78 y o  female MRN: [id number]  Unit/Bed#: ICU 04 Encounter: [id number]      Assessment:   According to ALLI Pittman, patient admitted with stage 4, POA on sacrum  Currently a sacral foam dressing is covering the wound  Patient has just been made comfort care, imminently dying  ALLI Pittman and Dariela Culp requesting that I cancel consult  Plan:   Wound care consult cancelled  Vitals: Blood pressure 108/55, pulse (!) 108, temperature 97 8 °F (36 6 °C), temperature source Temporal, resp  rate (!) 34, height 5' 6" (1 676 m), weight 85 8 kg (189 lb 2 5 oz), SpO2 (!) 56 %  ,Body mass index is 30 53 kg/m²  Wound 01/17/20 Pressure Injury Sacrum (Active)   Wound Description Clean;Dry; Intact 1/17/2020 12:00 PM   Staging Stage IV 1/17/2020  8:00 AM   Iraida-wound Assessment Pink;Fragile 1/17/2020 12:00 PM   Wound Length (cm) 4 5 cm 1/17/2020  8:00 AM   Wound Width (cm) 2 cm 1/17/2020  8:00 AM   Wound Depth (cm) 1 1/17/2020  8:00 AM   Calculated Wound Area (cm^2) 9 cm^2 1/17/2020  8:00 AM   Calculated Wound Volume (cm^3) 9 cm^3 1/17/2020  8:00 AM   Drainage Amount None 1/17/2020 12:00 PM   Treatments Site care 1/17/2020  8:00 AM   Dressing Foam, Silicon (eg  Allevyn, etc) 1/17/2020 12:00 PM   Dressing Status Clean;Dry; Intact 1/17/2020 12:00 PM

## 2020-01-17 NOTE — PROGRESS NOTES
Progress Note - Garett Johnson [birth year], 78 y o  female MRN: [id number]    Unit/Bed#: ICU 04 Encounter: [id number]    Primary Care Provider: Mariam Castillo MD   Date and time admitted to hospital: 1/16/2020 11:59 AM    * Bilateral pneumonia  Assessment & Plan  · She has a hx of ESBL UTI, will treat with Meropenum and Vanco for now  · ID c/s placed but cancelled as patient wants comfort care  · Urine strep and Legionella pending  · Vapotherm for support but will remove for comfort care    Acute respiratory failure with hypoxia ([facility] )  Assessment & Plan  · Patient want to be comfortable so will remove O2 and give meds for comfort    Warfarin-induced coagulopathy ([facility] )  Assessment & Plan  · INR elevated again this am, will hold Coumadin    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[facility] )  Assessment & Plan  · Supportive care    [facility]  Assessment & Plan  · Supportive care  · Keep comfortable    Essential hypertension  Assessment & Plan  · BP stable at present  · Transition to comfort care    Acquired hypothyroidism  Assessment & Plan  · Holding meds for comfort care      ----------------------------------------------------------------------------------------  HPI/24hr events: This am arrive to find patient ill appearing with moderate resp distress, after speaking to her she feels she does not want to live like this anymore and has asked to be kept comfortable and "put her to sleep"  She does not want to continue with her current treatment  She understands that if we stop she will die  She has asked to be removed from her oxygen and is ok with comfort care  Dr Gus Stanley has spoken to her children who are in agreement with this decision  Comfort care measures being started    Disposition: Comfort Care  Code Status: Level 4 - Comfort Care  ---------------------------------------------------------------------------------------  SUBJECTIVE    Review of Systems   Constitutional: Positive for fatigue  HENT: Negative      Eyes: Negative  Respiratory: Positive for shortness of breath  Cardiovascular: Negative  Gastrointestinal: Negative  Genitourinary: Negative  Musculoskeletal: Negative  Skin: Negative  Neurological: Positive for weakness  Psychiatric/Behavioral: Negative  All other systems reviewed and are negative     ---------------------------------------------------------------------------------------  OBJECTIVE    Physical Exam   Constitutional: She is oriented to person, place, and time  She appears well-developed  She is easily aroused  She has a sickly appearance  Nasal cannula in place  HENT:   Head: Normocephalic and atraumatic  Eyes: Pupils are equal, round, and reactive to light  EOM are normal    Cardiovascular: Regular rhythm  Tachycardia present  Pulmonary/Chest: Tachypnea noted  She is in respiratory distress  She has rales in the right lower field and the left lower field  Abdominal: Soft  Bowel sounds are normal  She exhibits no distension  There is no tenderness  Musculoskeletal: Normal range of motion  Neurological: She is alert, oriented to person, place, and time and easily aroused  Skin: Skin is warm and dry  Capillary refill takes less than 2 seconds         Vitals   Vitals:    20 0755 20 0800 20 1000 20 1128   BP:  135/63 115/56    BP Location:  Left arm     Pulse:  (!) 108 (!) 110    Resp:  (!) 32 (!) 31    Temp:  (!) 96 7 °F (35 9 °C)     TempSrc:  Temporal     SpO2: (!) 89% 92% 91% 94%   Weight:       Height:         Temp (24hrs), Av 2 °F (36 2 °C), Min:96 5 °F (35 8 °C), Max:98 2 °F (36 8 °C)  Current: Temperature: (!) 96 7 °F (35 9 °C)          SpO2: SpO2: (!) 56 %  , SpO2 Device: O2 Device: None (Room air)(Pt comfort care )  O2 Flow Rate (L/min): 40 L/min    Invasive/non-invasive ventilation settings   Respiratory    Lab Data (Last 4 hours)    None         O2/Vent Data (Last 4 hours)    None                Height and Weights   Height: 5' 6" (167 6 cm)  IBW: 59 3 kg  Body mass index is 30 53 kg/m²  Weight (last 2 days)     Date/Time   Weight    01/17/20 0559   85 8 (189 15)    01/17/20 0352   85 8 (189 15)    01/16/20 1512   83 8 (184 75)    01/16/20 1315   89 (196 21)              Intake and Output  I/O       01/15 0701 - 01/16 0700 01/16 0701 - 01/17 0700 01/17 0701 - 01/18 0700    I V  (mL/kg)  256 7 (3)     IV Piggyback  850     Total Intake(mL/kg)  1106 7 (12 9)     Urine (mL/kg/hr)  1250 250 (0 6)    Total Output  1250 250    Net  -143 3 -250                 Nutrition       Diet Orders   (From admission, onward)             Start     Ordered    01/17/20 1058  Diet Regular; Regular House  Diet effective now     Question Answer Comment   Diet Type Regular    Regular Regular House    RD to adjust diet per protocol?  No        01/17/20 1104    01/16/20 1737  Room Service  Once     Question:  Type of Service  Answer:  Room Service - Appropriate with Assistance    01/16/20 1736                Laboratory and Diagnostics:  Results from last 7 days   Lab Units 01/17/20  0521 01/16/20  1212   WBC Thousand/uL 19 67* 31 19*   HEMOGLOBIN g/dL 10 3* 10 8*   HEMATOCRIT % 31 5* 33 5*   PLATELETS Thousands/uL 254 210   BANDS PCT % 22* 60*   MONO PCT % 4 2*     Results from last 7 days   Lab Units 01/17/20  0521 01/16/20  1212   SODIUM mmol/L 137 133*   POTASSIUM mmol/L 3 1* 3 1*   CHLORIDE mmol/L 103 99*   CO2 mmol/L 23 22   ANION GAP mmol/L 11 12   BUN mg/dL 19 16   CREATININE mg/dL 0 70 0 63   CALCIUM mg/dL 9 1 9 3   GLUCOSE RANDOM mg/dL 107 83   ALT U/L  --  14   AST U/L  --  26   ALK PHOS U/L  --  130*   ALBUMIN g/dL  --  1 7*   TOTAL BILIRUBIN mg/dL  --  0 40     Results from last 7 days   Lab Units 01/17/20  0521 01/16/20  1212   MAGNESIUM mg/dL 2 0 1 7   PHOSPHORUS mg/dL 3 7  --       Results from last 7 days   Lab Units 01/17/20  0521 01/16/20 1212   INR  7 61* 5 54*   PTT seconds  --  85*      Results from last 7 days   Lab Units 01/16/20  1212 TROPONIN I ng/mL <0 02     Results from last 7 days   Lab Units 01/16/20  1212   LACTIC ACID mmol/L 1 0     ABG:    VBG:    Results from last 7 days   Lab Units 01/17/20  0521 01/16/20  1540   PROCALCITONIN ng/ml 1 71* 2 40*       Micro  Results from last 7 days   Lab Units 01/16/20  1255 01/16/20  1217 01/16/20  1212   BLOOD CULTURE   --  Received in Microbiology Lab  Culture in Progress  Received in Microbiology Lab  Culture in Progress  LEGIONELLA URINARY ANTIGEN  Negative  --   --    STREP PNEUMONIAE ANTIGEN, URINE  Negative  --   --        EKG: Sinus tachycardia  Imaging: I have personally reviewed pertinent reports        Active Medications  Scheduled Meds:  Current Facility-Administered Medications:  glycopyrrolate 0 2 mg Intravenous Q1H PRN Ivis Clos, CRNP   LORazepam 0 5 mg Intravenous Q1H PRN Ivis Clos, CRNP   morphine injection 2 mg Intravenous Q30 Min PRN Ivis Clos, CRNP   ondansetron 4 mg Intravenous Q8H PRN Ivis Clos, CRNP     Continuous Infusions:     PRN Meds:     glycopyrrolate 0 2 mg Q1H PRN   LORazepam 0 5 mg Q1H PRN   morphine injection 2 mg Q30 Min PRN   ondansetron 4 mg Q8H PRN       ---------------------------------------------------------------------------------------  Advance Directive and Living Will: Yes    Power of :    POLST:    ---------------------------------------------------------------------------------------    ADELINE Hobbs

## 2020-01-17 NOTE — PROGRESS NOTES
Family at bedside and supportive of patient's wishes to be kept comfortable  Hospice consult per Guido Hedrick  Awaiting additional orders as needed

## 2020-01-17 NOTE — SOCIAL WORK
LOS - 1 day    CPR2  Bundle-UTI    SW following to monitor needs and assist with planning  Pt currently in ICU  Pt was admitted from [facility]  Per [facility] pt was receiving rehab services at facility and could return for same  Pt's bed is on Medicaid hold  Case discussed with ICU PA  Pt requesting comfort care  SW met with pt and daughter, Renee , to discuss comfort care/hospice services  Hospice consult was ordered  SW reviewed hospice agency options with pt and daughter  Daughter chose to have referral made to [facility]  Referral will be made  Pt does have POA paperwork on chart  Pt's son in [place] is listed as POA  Per daughter she talked with the physicians about them calling son to update him  SW placed call to son, Leo Mcgraw, as well  Message left for son  SW offered ongoing support  SW will continue to follow to monitor needs and assist as needed
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
## 2020-01-17 NOTE — PROGRESS NOTES
Vancomycin IV Pharmacy-to-Dose Consultation    Cheyanne Crowe is a 78 y o  female who is currently receiving Vancomycin IV with management by the Pharmacy Consult service  Assessment/Plan:  The patient was reviewed  Renal function is stable and no signs or symptoms of nephrotoxicity and/or infusion reactions were documented in the chart  Based on todays assessment, continue current vancomycin (day # 2) dosing of 1250 mg IV q12h, with a plan for trough to be drawn at 1400 on 01/18/2020  We will continue to follow the patients culture results and clinical progress daily      Michael Valdez, Pharmacist

## 2020-01-17 NOTE — SOCIAL WORK
Pt was evaluated by [facility] nurse and accepted for inpatient hospice care  Pt was transitioned to [facility] this afternoon

## 2020-01-17 NOTE — ASSESSMENT & PLAN NOTE
· She has a hx of ESBL UTI, will treat with Meropenum and Vanco for now  · ID c/s placed but cancelled as patient wants comfort care  · Urine strep and Legionella pending  · Vapotherm for support but will remove for comfort care

## 2020-01-17 NOTE — PROGRESS NOTES
During priming of pump tubing inadvertently wasted 11cc of morphine   Hilda Dawson Rn / Khadar Zafar Rn Waste witnessed by Rylan Hernandez

## 2020-01-18 LAB — MRSA NOSE QL CULT: NORMAL

## 2020-01-20 ENCOUNTER — PATIENT OUTREACH (OUTPATIENT)
Dept: CASE MANAGEMENT | Facility: OTHER | Age: 80
End: 2020-01-20

## 2020-01-20 NOTE — PROGRESS NOTES
ADT notification received of patient discharge from [place] [address]  Discharge report indicates patient   BPCI form updated, care coordination note removed and bundle episode resolved  CM removed self from care team and sent Inbasket message to EDEN Estrada regarding bundle closure

## 2020-01-21 LAB
BACTERIA BLD CULT: NORMAL
BACTERIA BLD CULT: NORMAL

## 2020-01-30 ENCOUNTER — TELEPHONE (OUTPATIENT)
Dept: NEUROLOGY | Facility: CLINIC | Age: 80
End: 2020-01-30

## 2020-01-30 NOTE — TELEPHONE ENCOUNTER
Spoke with daughter  Both pt and daughter with pneumonia  Pt went on hospice and passed away  condolences to family sent personally

## 2020-01-30 NOTE — TELEPHONE ENCOUNTER
Patients daughter called to inform you patient has passed away  No needs at this time just calling to make you aware

## (undated) DEVICE — BASIC ULTRASOUND: Brand: ALCON, INFINITI

## (undated) DEVICE — 3M™ TEGADERM™ TRANSPARENT FILM DRESSING FRAME STYLE, 1624W, 2-3/8 IN X 2-3/4 IN (6 CM X 7 CM), 100/CT 4CT/CASE: Brand: 3M™ TEGADERM™

## (undated) DEVICE — TURBOSONICS MICROSMOOTH MICROTIP PARTS KIT: Brand: TURBOSONICS, MICROSMOOTH, MICROTIP, ALCON

## (undated) DEVICE — GUIDEWIRE STRGHT TIP 0.038 IN SOLO PLUS

## (undated) DEVICE — CANNULA HYDRODISSECTION NUCLEUS 25G X 7/8IN 35DEG 8MM FROM END SINGLE-USE VISITEC

## (undated) DEVICE — 30° KELMAN®, 0.9 MM TURBOSONICS® ABS® MICROTIP™ TIP: Brand: ALCON, TURBOSONICS, ABS, MICROTIP

## (undated) DEVICE — FABRIC REINFORCED, SURGICAL GOWN, XL: Brand: CONVERTORS

## (undated) DEVICE — GLOVE SRG BIOGEL 7.5

## (undated) DEVICE — EYE PACK CUSTOM -FINNEGAN

## (undated) DEVICE — LUBRICANT SURGILUBE TUBE 4 OZ  FLIP TOP

## (undated) DEVICE — CYSTOSCOPY PACK: Brand: CONVERTORS

## (undated) DEVICE — THE MONARCH® "D" CARTRIDGE IS A SINGLE-USE POLYPROPYLENE CARTRIDGE FOR POSTERIOR CHAMBER IOL DELIVERY: Brand: MONARCH® III

## (undated) DEVICE — CYSTO TUBING TUR Y IRRIGATION

## (undated) DEVICE — NEEDLE PERIBULBAR 25G X 7/8 IN

## (undated) DEVICE — EYE PADS 1 5/8"X2 5/8": Brand: MCKESSON

## (undated) DEVICE — URETERAL CATHETER POLLACK OPEN ENDED 5FR

## (undated) DEVICE — AIR INJECT CANNULA 27GA: Brand: OPHTHALMIC CANNULA

## (undated) DEVICE — RADIOLOGY STERILE LABELS: Brand: CENTURION

## (undated) DEVICE — CLEARCUT® SLIT KNIFE 2.75MM ANGLED: Brand: CLEARCUT®

## (undated) DEVICE — URETERAL CATH 5 FR

## (undated) DEVICE — POUCH UR CATCHER STERILE

## (undated) DEVICE — B-H IRRIGATING CAN 19GA FLAT ANGLED 8MM: Brand: OPHTHALMIC CANNULA

## (undated) DEVICE — SUT VICRYL 10-0 CS140-6 4 IN V960G